# Patient Record
Sex: MALE | Race: WHITE | NOT HISPANIC OR LATINO | Employment: OTHER | URBAN - METROPOLITAN AREA
[De-identification: names, ages, dates, MRNs, and addresses within clinical notes are randomized per-mention and may not be internally consistent; named-entity substitution may affect disease eponyms.]

---

## 2021-01-14 ENCOUNTER — OFFICE VISIT (OUTPATIENT)
Dept: DERMATOLOGY | Facility: CLINIC | Age: 81
End: 2021-01-14
Payer: COMMERCIAL

## 2021-01-14 VITALS — BODY MASS INDEX: 27.92 KG/M2 | WEIGHT: 195 LBS | HEIGHT: 70 IN | TEMPERATURE: 98.7 F

## 2021-01-14 DIAGNOSIS — Z85.820 HISTORY OF MELANOMA: Primary | ICD-10-CM

## 2021-01-14 DIAGNOSIS — D22.9 MULTIPLE MELANOCYTIC NEVI: ICD-10-CM

## 2021-01-14 DIAGNOSIS — L81.4 LENTIGO: ICD-10-CM

## 2021-01-14 DIAGNOSIS — Z85.828 HISTORY OF BASAL CELL CARCINOMA: ICD-10-CM

## 2021-01-14 DIAGNOSIS — D48.9 NEOPLASM OF UNCERTAIN BEHAVIOR: ICD-10-CM

## 2021-01-14 PROCEDURE — 11103 TANGNTL BX SKIN EA SEP/ADDL: CPT | Performed by: DERMATOLOGY

## 2021-01-14 PROCEDURE — 88341 IMHCHEM/IMCYTCHM EA ADD ANTB: CPT | Performed by: STUDENT IN AN ORGANIZED HEALTH CARE EDUCATION/TRAINING PROGRAM

## 2021-01-14 PROCEDURE — 88305 TISSUE EXAM BY PATHOLOGIST: CPT | Performed by: STUDENT IN AN ORGANIZED HEALTH CARE EDUCATION/TRAINING PROGRAM

## 2021-01-14 PROCEDURE — 99204 OFFICE O/P NEW MOD 45 MIN: CPT | Performed by: DERMATOLOGY

## 2021-01-14 PROCEDURE — 11102 TANGNTL BX SKIN SINGLE LES: CPT | Performed by: DERMATOLOGY

## 2021-01-14 PROCEDURE — 88342 IMHCHEM/IMCYTCHM 1ST ANTB: CPT | Performed by: STUDENT IN AN ORGANIZED HEALTH CARE EDUCATION/TRAINING PROGRAM

## 2021-01-14 RX ORDER — LOSARTAN POTASSIUM 50 MG/1
50 TABLET ORAL DAILY
COMMUNITY
Start: 2020-07-01 | End: 2021-07-01

## 2021-01-14 RX ORDER — FEBUXOSTAT 40 MG/1
TABLET ORAL
COMMUNITY
Start: 2020-12-31

## 2021-01-14 RX ORDER — SIMVASTATIN 10 MG
10 TABLET ORAL EVERY EVENING
COMMUNITY
Start: 2020-11-16

## 2021-01-14 NOTE — PROGRESS NOTES
[facility] Note     Patient Name: Krystal Dumont Sr   Encounter Date: 01/14/2021     Have you been cared for by a [facility] Dermatologist in the last 3 years and, if so, which one? No    · Have you traveled outside of the [address] [place] [address] in the past 3 months or outside of the [facility] area in the last 2 weeks? No     May we call your Preferred Phone number to discuss your specific medical information? Yes     May we leave a detailed message that includes your specific medical information? Yes      Today's Chief Concerns:   Concern #1:  Full body check       Past Medical History:  Have you personally ever had or currently have any of the following? · Skin cancer (such as Melanoma, Basal Cell Carcinoma, Squamous Cell Carcinoma? (If Yes, please provide more detail)- YES, Melanoma, Basal Cell Carcinoma       Family History:  Have any of your "first degree relatives" (parent, brother, sister, or child) had any of the following       · Skin cancer such as Melanoma or Merkel Cell Carcinoma or Pancreatic Cancer? No      Current Medications:   (please update all dermatological medications before printing patient's AVS!)      Current Outpatient Medications:     apixaban (ELIQUIS) 5 mg, Take 5 mg by mouth 2 (two) times a day, Disp: , Rfl:     losartan (COZAAR) 50 mg tablet, Take 50 mg by mouth daily, Disp: , Rfl:     metoprolol tartrate (LOPRESSOR) 25 mg tablet, take 1 tablet by mouth twice a day, Disp: , Rfl:     Cholecalciferol 50 MCG (2000 UT) CAPS, Take 1 capsule by mouth, Disp: , Rfl:     simvastatin (ZOCOR) 10 mg tablet, Take 10 mg by mouth every evening, Disp: , Rfl:     Uloric 40 MG tablet, , Disp: , Rfl:         · Any known allergies?       · No Known Allergies      Physical Exam:     Was a chaperone (Derm Clinical Assistant) present throughout the entire Physical Exam? Yes     Did the Dermatology Team specifically  the patient on the importance of a Full Skin Exam to be sure that nothing is missed clinically?  Yes}  o Did the patient ultimately request or accept a Full Skin Exam?  Yes  o Did the patient specifically refuse to have the areas "under-the-bra" examined by the Dermatologist? No  o Did the patient specifically refuse to have the areas "under-the-underwear" examined by the Dermatologist? No    CONSTITUTIONAL:   Vitals:    01/14/21 1532   Temp: 98 7 °F (37 1 °C)   Weight: 88 5 kg (195 lb)   Height: 5' 10" (1 778 m)           PSYCH: Normal mood and affect  EYES: Normal conjunctiva  ENT: Normal lips and oral mucosa  CARDIOVASCULAR: No edema  RESPIRATORY: Normal respirations  HEME/LYMPH/IMMUNO:  No regional lymphadenopathy except as noted below in "ASSESSMENT AND PLAN BY DIAGNOSIS"    SKIN:  FULL ORGAN SYSTEM EXAM   Hair, Scalp, Ears, Face Normal except as noted below in Assessment   Neck, Cervical Chain Nodes Normal except as noted below in Assessment   Right Arm/Hand/Fingers Normal except as noted below in Assessment   Left Arm/Hand/Fingers Normal except as noted below in Assessment   Chest/Breasts/Axillae Viewed areas Normal except as noted below in Assessment   Abdomen, Umbilicus Normal except as noted below in Assessment   Back/Spine Normal except as noted below in Assessment   Groin/Genitalia/Buttocks Normal except as noted below in Assessment   Right Leg, Foot, Toes Normal except as noted below in Assessment   Left Leg, Foot, Toes Normal except as noted below in Assessment      HISTORY OF MELANOMA    Physical Exam:   Anatomic Location Affected:  Mid abdomen    Morphological Description of Scar:  Well healed scar    Year Treated: 2005   TNM Classification:    Suspected Recurrence: no   Regional adenopathy: no    Additional History of Present Condition:     Assessment and Plan:  Based on a thorough discussion of this condition and the management approach to it (including a comprehensive discussion of the known risks, side effects and potential benefits of treatment), the patient (family) agrees to implement the following specific plan:   When outside we recommend using a wide brim hat, sunglasses, long sleeve and pants, sunscreen with SPF 88+ with reapplication every 2 hours, or SPF specific clothing     What happens at follow-up? The main purpose of follow-up is to detect recurrences early (metastatic melanoma), but it also offers an opportunity to diagnose a new primary melanoma at the first possible opportunity  A second invasive melanoma occurs in 5-10% of melanoma patients and a new melanoma in situ is diagnosed in more than 20% of melanoma patients  Our practice makes the following recommendations for follow-up for patients with invasive melanoma   At-least "monthly" self-skin examinations    Routine skin checks by a board certified dermatologist   Follow-up intervals are "every 3 months" within 2 years of a new melanoma diagnosis; "every 6 months" between 2-4 years of a new melanoma diagnosis; and "annually" after 4 years of a new melanoma diagnosis   Individual patient's needs should be considered before an appropriate follow-up is offered   Provide education and support to help the patient adjust to their illness    Follow-up appointments should include:   A check of the scar where the primary melanoma was removed   Checking the regional lymph nodes   A general skin examination   A full physical examination at least annually by your primary care physician    In those with more advanced primary disease, follow-up may include:   Blood tests   Imaging: ultrasound, X-ray, CT, MRI and PET scan  Most tests are not worthwhile for patients with stage 1 or 2 melanoma unless there are signs or symptoms of disease recurrence or metastasis  No tests are necessary for healthy patients who have remained well for five years or longer after removal of their melanoma  What is the outlook for patients with melanoma?    Melanoma in situ is cured by excision because it has no potential to spread around the body   The risk of spread and ultimate death from invasive melanoma depends on several factors, but the main one is the Breslow thickness of the melanoma at the time it was surgically removed   Metastases are rare for melanomas < 0 75 mm and the risk for tumours 0 75-1 mm thick is about 5%  The risk steadily increases with thickness so that melanomas > 4 mm have a risk of metastasis of about 40%  Melanoma is a potentially serious type of skin cancer, in which there is uncontrolled growth of melanocytes (pigment cells)  Melanoma is sometimes called malignant melanoma  Normal melanocytes are found in the basal layer of the epidermis (the outer layer of skin)  Melanocytes produce a protein called melanin, which protects skin cells by absorbing ultraviolet (UV) radiation  Melanocytes are found in equal numbers in black and white skin, but melanocytes in black skin produce much more melanin  People with dark brown or black skin are very much less likely to be damaged by UV radiation than those with white skin  HISTORY OF BASAL CELL CARCINOMA    Physical Exam:   Anatomic Location Affected:  Right Breast    Morphological Description of scar:  Well healed scar    Suspected Recurrence: No   Pertinent Positives:   Pertinent Negatives: Additional History of Present Condition:  History of basal cell carcinoma with no sign of recurrence    Assessment and Plan:  Based on a thorough discussion of this condition and the management approach to it (including a comprehensive discussion of the known risks, side effects and potential benefits of treatment), the patient (family) agrees to implement the following specific plan:   When outside we recommend using a wide brim hat, sunglasses, long sleeve and pants, sunscreen with SPF 57+ with reapplication every 2 hours, or SPF specific clothing     How can basal cell carcinoma be prevented?   The most important way to prevent BCC is to avoid sunburn  This is especially important in childhood and early life  Fair skinned individuals and those with a personal or family history of BCC should protect their skin from sun exposure daily, year-round and lifelong   Stay indoors or under the shade in the middle of the day    Wear covering clothing    Apply high protection factor SPF50+ broad-spectrum sunscreens generously to exposed skin if outdoors    Avoid indoor tanning (sun beds, solaria)   Oral nicotinamide (vitamin B3) in a dose of 500 mg twice daily may reduce the number and severity of BCCs  What is the outlook for basal cell carcinoma? Most BCCs are cured by treatment  Cure is most likely if treatment is undertaken when the lesion is small  About 50% of people with BCC develop a second one within 3 years of the first  They are also at increased risk of other skin cancers, especially melanoma  Regular self-skin examinations and long-term annual skin checks by an experienced health professional are recommended  NEOPLASM OF UNCERTAIN BEHAVIOR OF SKIN A    Physical Exam:   (Anatomic Location); (Size and Morphological Description); (Differential Diagnosis):  o Left Earlobe 0 5 cm lucent papule; Diffdx; Basal Cell Carcinoma    Pertinent Positives:   Pertinent Negatives:    Assessment and Plan:   I have discussed with the patient that a sample of skin via a "skin biopsy would be potentially helpful to further make a specific diagnosis under the microscope   Based on a thorough discussion of this condition and the management approach to it (including a comprehensive discussion of the known risks, side effects and potential benefits of treatment), the patient (family) agrees to implement the following specific plan:    o Procedure:  Skin Biopsy    After a thorough discussion of treatment options and risk/benefits/alternatives (including but not limited to local pain, scarring, dyspigmentation, blistering, possible superinfection, and inability to confirm a diagnosis via histopathology), verbal and written consent were obtained and portion of the rash was biopsied for tissue sample  See below for consent that was obtained from patient and subsequent Procedure Note  PROCEDURE SHAVE BIOPSY NOTE:     Performing Physician: Dr Burns   Anatomic Location; Clinical Description with size (cm); Pre-Op Diagnosis:                                     Left Earlobe 0 5 cm lucent papule; Diffdx; Basal Cell Carcinoma    Post-op diagnosis: Same      Local anesthesia: 1% xylocaine with epi       Topical anesthesia: None     Hemostasis: Aluminum chloride       After obtaining informed consent  at which time there was a discussion about the purpose of biopsy  and low risks of infection and bleeding  The area was prepped and draped in the usual fashion  Anesthesia was obtained with 1% lidocaine with epinephrine  A shave biopsy to an appropriate sampling depth was obtained with a sterile blade (such as a 15-blade or DermaBlade)  The resulting wound was covered with surgical ointment and bandaged appropriately  The patient tolerated the procedure well without complications and was without signs of functional compromise  Specimen has been sent for review by Dermatopathology  Standard post-procedure care has been explained and has been included in written form within the patient's copy of Informed Consent  INFORMED CONSENT DISCUSSION AND POST-OPERATIVE INSTRUCTIONS FOR PATIENT    I   RATIONALE FOR PROCEDURE  I understand that a skin biopsy allows the Dermatologist to test a lesion or rash under the microscope to obtain a diagnosis  It usually involves numbing the area with numbing medication and removing a small piece of skin; sometimes the area will be closed with sutures  In this specific procedure, sutures are not usually needed    If any sutures are placed, then they are usually need to be removed in 2 weeks or less  I understand that my Dermatologist recommends that a skin "shave" biopsy be performed today  A local anesthetic, similar to the kind that a dentist uses when filling a cavity, will be injected with a very small needle into the skin area to be sampled  The injected skin and tissue underneath "will go to sleep and become numb so no pain should be felt afterwards  An instrument shaped like a tiny "razor blade" (shave biopsy instrument) will be used to cut a small piece of tissue and skin from the area so that a sample of tissue can be taken and examined more closely under the microscope  A slight amount of bleeding will occur, but it will be stopped with direct pressure and a pressure bandage and any other appropriate methods  I understands that a scar will form where the wound was created  Surgical ointment will be applied to help protect the wound  Sutures are not usually needed  II   RISKS AND POTENTIAL COMPLICATIONS   I understand the risks and potential complications of a skin biopsy include but are not limited to the following:   Bleeding   Infection   Pain   Scar/keloid   Skin discoloration   Incomplete Removal   Recurrence   Nerve Damage/Numbness/Loss of Function   Allergic Reaction to Anesthesia   Biopsies are diagnostic procedures and based on findings additional treatment or evaluation may be required   Loss or destruction of specimen resulting in no additional findings    My Dermatologist has explained to me the nature of the condition, the nature of the procedure, and the benefits to be reasonably expected compared with alternative approaches  My Dermatologist has discussed the likelihood of major risks or complications of this procedure including the specific risks listed above, such as bleeding, infection, and scarring/keloid  I understand that a scar is expected after this procedure    I understand that my physician cannot predict if the scar will form a "keloid," which extends beyond the borders of the wound that is created  A keloid is a thick, painful, and bumpy scar  A keloid can be difficult to treat, as it does not always respond well to therapy, which includes injecting cortisone directly into the keloid every few weeks  While this usually reduces the pain and size of the scar, it does not eliminate it  I understand that photographs may be taken before and after the procedure  These will be maintained as part of the medical providers confidential records and may not be made available to me  I further authorize the medical provider to use the photographs for teaching purposes or to illustrate scientific papers, books, or lectures if in his/her judgment, medical research, education, or science may benefit from its use  I have had an opportunity to fully inquire about the risks and benefits of this procedure and its alternatives  I have been given ample time and opportunity to ask questions and to seek a second opinion if I wished to do so  I acknowledge that there have specifically been no guarantees as to the cosmetic results from the procedure  I am aware that with any procedure there is always the possibility of an unexpected complication  III  POST-PROCEDURAL CARE (WHAT YOU WILL NEED TO DO "AFTER THE BIOPSY" TO OPTIMIZE HEALING)     Keep the area clean and dry  Try NOT to remove the bandage or get it wet for the first 24 hours   Gently clean the area and apply surgical ointment (such as Vaseline petrolatum ointment, which is available "over the counter" and not a prescription) to the biopsy site for up to 2 weeks straight  This acts to protect the wound from the outside world   Sutures are not usually placed in this procedure  If any sutures were placed, return for suture removal as instructed (generally 1 week for the face, 2 weeks for the body)   Take Acetaminophen (Tylenol) for discomfort, if no contraindications  Ibuprofen or aspirin could make bleeding worse   Call our office immediately for signs of infection: fever, chills, increased redness, warmth, tenderness, discomfort/pain, or pus or foul smell coming from the wound  WHAT TO DO IF THERE IS ANY BLEEDING? If a small amount of bleeding is noticed, place a clean cloth over the area and apply firm pressure for ten minutes  Check the wound after 10 minutes of direct pressure  If bleeding persists, try one more time for an additional 10 minutes of direct pressure on the area  If the bleeding becomes heavier or does not stop after the second attempt, or if you have any other questions about this procedure, then please call your [facility]ermatologist by calling [phone] (SKIN)  I hereby acknowledge that I have reviewed and verified the site with my Dermatologist and have requested and authorized my Dermatologist to proceed with the procedure  NEOPLASM OF UNCERTAIN BEHAVIOR OF SKIN B    Physical Exam:   (Anatomic Location); (Size and Morphological Description); (Differential Diagnosis):  Left lower back; 1 cm x 0 7cm ulcerated red plaque; Diffdx; Basal Cell Carcinoma    Pertinent Positives:   Pertinent Negatives:      Assessment and Plan:   I have discussed with the patient that a sample of skin via a "skin biopsy would be potentially helpful to further make a specific diagnosis under the microscope   Based on a thorough discussion of this condition and the management approach to it (including a comprehensive discussion of the known risks, side effects and potential benefits of treatment), the patient (family) agrees to implement the following specific plan:    o Procedure:  Skin Biopsy    After a thorough discussion of treatment options and risk/benefits/alternatives (including but not limited to local pain, scarring, dyspigmentation, blistering, possible superinfection, and inability to confirm a diagnosis via histopathology), verbal and written consent were obtained and portion of the rash was biopsied for tissue sample  See below for consent that was obtained from patient and subsequent Procedure Note  PROCEDURE SHAVE BIOPSY NOTE:     Performing Physician: Dr Burns   Anatomic Location; Clinical Description with size (cm); Pre-Op Diagnosis:                                 Left lower back; 1 cm x 0 7cm ulcerated red plaque; Diffdx; Basal Cell Carcinoma   Post-op diagnosis: Same      Local anesthesia: 1% xylocaine with epi       Topical anesthesia: None     Hemostasis: Aluminum chloride     NEOPLASM OF UNCERTAIN BEHAVIOR OF SKIN C     Physical Exam:   (Anatomic Location); (Size and Morphological Description); (Differential Diagnosis):  o Right shoulder; 0 7cm x 0 5 cm brown macule with irregular pigment DIFF atypical nevus VERSUS Melanoma situ    Pertinent Positives:   Pertinent Negatives:    Assessment and Plan:   I have discussed with the patient that a sample of skin via a "skin biopsy would be potentially helpful to further make a specific diagnosis under the microscope   Based on a thorough discussion of this condition and the management approach to it (including a comprehensive discussion of the known risks, side effects and potential benefits of treatment), the patient (family) agrees to implement the following specific plan:    o Procedure:  Skin Biopsy  After a thorough discussion of treatment options and risk/benefits/alternatives (including but not limited to local pain, scarring, dyspigmentation, blistering, possible superinfection, and inability to confirm a diagnosis via histopathology), verbal and written consent were obtained and portion of the rash was biopsied for tissue sample  See below for consent that was obtained from patient and subsequent Procedure Note    PROCEDURE SHAVE BIOPSY NOTE:     Performing Physician: Dr Burns   Anatomic Location; Clinical Description with size (cm); Pre-Op Diagnosis: Right shoulder; 0 7cm x0 5cm brown macule with irregular pigment DIFF atypical                             nevus VERSUS Melanoma situ    Post-op diagnosis: Same      Local anesthesia: 1% xylocaine with epi       Topical anesthesia: None     Hemostasis: Aluminum chloride       NEOPLASM OF UNCERTAIN BEHAVIOR OF SKIN D    Physical Exam:   (Anatomic Location); (Size and Morphological Description); (Differential Diagnosis):  o Right Arm; 1 cm x 1 cm  brown macule with irregular pigment; DIFF; Atypical  Nnevus VERSUS Melanoma situ    Pertinent Positives:   Pertinent Negatives:      Assessment and Plan:   I have discussed with the patient that a sample of skin via a "skin biopsy would be potentially helpful to further make a specific diagnosis under the microscope   Based on a thorough discussion of this condition and the management approach to it (including a comprehensive discussion of the known risks, side effects and potential benefits of treatment), the patient (family) agrees to implement the following specific plan:    o Procedure:  Skin Biopsy  After a thorough discussion of treatment options and risk/benefits/alternatives (including but not limited to local pain, scarring, dyspigmentation, blistering, possible superinfection, and inability to confirm a diagnosis via histopathology), verbal and written consent were obtained and portion of the rash was biopsied for tissue sample  See below for consent that was obtained from patient and subsequent Procedure Note  PROCEDURE SHAVE BIOPSY NOTE:     Performing Physician: Dr Burns   Anatomic Location; Clinical Description with size (cm); Pre-Op Diagnosis:                                 Right Arm; 1 cm x 1 cm  brown macule with irregular pigment; DIFF;  Atypical                                nevus VERSUS Melanoma situ    Post-op diagnosis: Same      Local anesthesia: 1% xylocaine with epi       Topical anesthesia: None     Hemostasis: Aluminum chloride       LENTIGO    Physical Exam:   Anatomic Location Affected:  Trunk and extremities    Morphological Description:  Light brown macules   Pertinent Positives:   Pertinent Negatives:     Assessment and Plan:  Based on a thorough discussion of this condition and the management approach to it (including a comprehensive discussion of the known risks, side effects and potential benefits of treatment), the patient (family) agrees to implement the following specific plan:   When outside we recommend using a wide brim hat, sunglasses, long sleeve and pants, sunscreen with SPF 53+ with reapplication every 2 hours, or SPF specific clothing     What is a lentigo? A lentigo is a pigmented flat or slightly raised lesion with a clearly defined edge  Unlike an ephelis (freckle), it does not fade in the winter months  There are several kinds of lentigo  The name lentigo originally referred to its appearance resembling a small lentil  The plural of lentigo is lentigines, although lentigos is also in common use  Who gets lentigines? Lentigines can affect males and females of all ages and races  Solar lentigines are especially prevalent in fair skinned adults  Lentigines associated with syndromes are present at birth or arise during childhood  What causes lentigines? Common forms of lentigo are due to exposure to ultraviolet radiation:   Sun damage including sunburn    Indoor tanning    Phototherapy, especially photochemotherapy (PUVA)    Ionizing radiation, eg radiation therapy, can also cause lentigines  Several familial syndromes associated with widespread lentigines originate from mutations in Eloy-MAP kinase, mTOR signaling and PTEN pathways  What are the clinical features of lentigines?   Lentigines have been classified into several different types depending on what they look like, where they appear on the body, causative factors, and whether they are associated to other diseases or conditions  Lentigines may be solitary or more often, multiple  Most lentigines are smaller than 5 mm in diameter      Lentigo simplex   A precursor to junctional naevus    Arises during childhood and early adult life    Found on trunk and limbs    Small brown round or oval macule or thin plaque    Jagged or smooth edge    May have a dry surface    May disappear in time  Solar lentigo   A precursor to seborrhoeic keratosis    Found on chronically sun exposed sites such as hands, face, lower legs    May also follow sunburn to shoulders    Yellow, light or dark brown regular or irregular macule or thin plaque    May have a dry surface    Often has moth-eaten outline    Can slowly enlarge to several centimeters in diameter    May disappear, often through the process known as lichenoid keratosis    When atypical in appearance, may be difficult to distinguish from melanoma in situ  Ink spot lentigo   Also known as reticulated lentigo    Few in number compared to solar lentigines    Follows sunburn in very fair skinned individuals    Dark brown to black irregular ink spot-like macule  PUVA lentigo   Similar to ink spot lentigo but follows photochemotherapy (PUVA)    Location anywhere exposed to PUVA  Tanning bed lentigo   Similar to ink spot lentigo but follows indoor tanning    Location anywhere exposed to tanning bed  Radiation lentigo   Occurs in site of irradiation (accidental or therapeutic)    Associated with late-stage radiation dermatitis: epidermal atrophy, subcutaneous fibrosis, keratosis, telangiectasias  Melanotic macule   Mucosal surfaces or adjacent glabrous skin eg lip, vulva, penis, anus    Light to dark brown    Also called mucosal melanosis  Generalised lentigines   Found on any exposed or covered site from early childhood    Small macules may merge to form larger patches    Not associated with a syndrome    Also called lentigines profusa, multiple lentigines  Agminated lentigines   Naevoid eruption of lentigos confined to a single segmental area    Sharp demarcation in midline    May have associated neurological and developmental abnormalities  Patterned lentigines   Inherited tendency to lentigines on face, lips, buttocks, palms, soles    Recognised mainly in people of  ethnicity  Centrofacial neurodysraphic lentiginosis  Associated with mental retardation  Lentiginosis syndromes   Syndromes include LEOPARD/Regan, Peutz-Jeghers, Laugier-Hunziker, Moynahan, Xeroderma pigmentosum, myxoma syndromes (RUBIO, NAME, Garcia), Ruvalcaba-Myhre-Hoyt, Bannayan-Zonnana syndrome, Cowden disease (multiple hamartoma syndrome )    Inheritance is autosomal dominant; sporadic cases common    Widespread lentigines present at birth or arise in early childhood    Associated with neural, endocrine, and mesenchymal tumors    How is the diagnosis made? Lentigines are usually diagnosed clinically by their typical appearance  Concern regarding possibility of melanoma may lead to:   Dermatoscopy    Diagnostic excision biopsy    Histopathology of a lentigo shows:   Thickened epidermis    An increased number of melanocytes along the basal layer of epidermis    Unlike junctional melanocytic naevus, there are no nests of melanocytes    Increased melanin pigment within the keratinocytes    Additional features depending on type of lentigo    In contrast, an ephelis (freckle) shows sun-induced increased melanin within the keratinocytes, without an increase in number of cells  What is the treatment for lentigines? Most lentigines are left alone  Attempts to lighten them may not be successful   The following approaches are used:   SPF 50+ broad-spectrum sunscreen    Hydroquinone bleaching cream    Alpha hydroxy acids    Vitamin C    Retinoids    Azelaic acid    Chemical peels  Individual lesions can be permanently removed using:   Cryotherapy    Intense pulsed light    Pigment lasers    How can lentigines be prevented? Lentigines associated with exposure ultraviolet radiation can be prevented by very careful sun protection  Clothing is more successful at preventing new lentigines than are sunscreens  What is the outlook for lentigines? Lentigines usually persist  They may increase in number with age and sun exposure  Some in sun-protected sites may fade and disappear     Stucco Keratosis   Physical Exam:   Anatomic Location Affected: Bilateral lower Legs    Morphological Description:  Crumbly verrucous papules and macules    Pertinent Positives:   Pertinent Negatives:    Assessment and Plan:  Based on a thorough discussion of this condition and the management approach to it (including a comprehensive discussion of the known risks, side effects and potential benefits of treatment), the patient (family) agrees to implement the following specific plan:   Reassure benign, monitor for any changes     MELANOCYTIC NEVI ("Moles")    Physical Exam:   Anatomic Location Affected:   Mostly on sun-exposed areas of the trunk and extremities    Morphological Description:  Scattered, 1-4mm round to ovoid, symmetrical-appearing, even bordered, skin colored to dark brown macules/papules, mostly in sun-exposed areas   Pertinent Positives:   Pertinent Negatives:      Assessment and Plan:  Based on a thorough discussion of this condition and the management approach to it (including a comprehensive discussion of the known risks, side effects and potential benefits of treatment), the patient (family) agrees to implement the following specific plan:   Reassure benign, monitor for any changes    When outside we recommend using a wide brim hat, sunglasses, long sleeve and pants, sunscreen with SPF 78+ with reapplication every 2 hours, or SPF specific clothing      Melanocytic Nevi  Melanocytic nevi ("moles") are tan or brown, raised or flat areas of the skin which have an increased number of melanocytes  Melanocytes are the cells in our body which make pigment and account for skin color  Some moles are present at birth (I e , "congenital nevi"), while others come up later in life (i e , "acquired nevi")  The sun can stimulate the body to make more moles  Sunburns are not the only thing that triggers more moles  Chronic sun exposure can do it too  Clinically distinguishing a healthy mole from melanoma may be difficult, even for experienced dermatologists  The "ABCDE's" of moles have been suggested as a means of helping to alert a person to a suspicious mole and the possible increased risk of melanoma  The suggestions for raising alert are as follows:    Asymmetry: Healthy moles tend to be symmetric, while melanomas are often asymmetric  Asymmetry means if you draw a line through the mole, the two halves do not match in color, size, shape, or surface texture  Asymmetry can be a result of rapid enlargement of a mole, the development of a raised area on a previously flat lesion, scaling, ulceration, bleeding or scabbing within the mole  Any mole that starts to demonstrate "asymmetry" should be examined promptly by a board certified dermatologist      Border: Healthy moles tend to have discrete, even borders  The border of a melanoma often blends into the normal skin and does not sharply delineate the mole from normal skin  Any mole that starts to demonstrate "uneven borders" should be examined promptly by a board certified dermatologist      Color: Healthy moles tend to be one color throughout  Melanomas tend to be made up of different colors ranging from dark black, blue, white, or red  Any mole that demonstrates a color change should be examined promptly by a board certified dermatologist      Diameter: Healthy moles tend to be smaller than 0 6 cm in size; an exception are "congenital nevi" that can be larger    Melanomas tend to grow and can often be greater than 0 6 cm (1/4 of an inch, or the size of a pencil eraser)  This is only a guideline, and many normal moles may be larger than 0 6 cm without being unhealthy  Any mole that starts to change in size (small to bigger or bigger to smaller) should be examined promptly by a board certified dermatologist      Evolving: Healthy moles tend to "stay the same "  Melanomas may often show signs of change or evolution such as a change in size, shape, color, or elevation  Any mole that starts to itch, bleed, crust, burn, hurt, or ulcerate or demonstrate a change or evolution should be examined promptly by a board certified dermatologist       Dysplastic Nevi  Dysplastic moles are moles that fit the ABCDE rules of melanoma but are not identified as melanomas when examined under the microscope  They may indicate an increased risk of melanoma in that person  If there is a family history of melanoma, most experts agree that the person may be at an increased risk for developing a melanoma  Experts still do not agree on what dysplastic moles mean in patients without a personal or family history of melanoma  Dysplastic moles are usually larger than common moles and have different colors within it with irregular borders  The appearance can be very similar to a melanoma  Biopsies of dysplastic moles may show abnormalities which are different from a regular mole  Melanoma  Malignant melanoma is a type of skin cancer that can be deadly if it spreads throughout the body  The incidence of melanoma in the [place] is growing faster than any other cancer  Melanoma usually grows near the surface of the skin for a period of time, and then begins to grow deeper into the skin  Once it grows deeper into the skin, the risk of spread to other organs greatly increases   Therefore, early detection and removal of a malignant melanoma may result in a better chance at a complete cure; removal after the tumor has spread may not be as effective, leading to worse clinical outcomes such as death  The true rate of nevus transformation into a melanoma is unknown  It has been estimated that the lifetime risk for any acquired melanocytic nevus on any 21year-old individual transforming into melanoma by age [de-identified] is 0 03% (1 in 3,164) for men and 0 009% (1 in 10,800) for women  The appearance of a "new mole" remains one of the most reliable methods for identifying a malignant melanoma  Occasionally, melanomas appear as rapidly growing, blue-black, dome-shaped bumps within a previous mole or previous area of normal skin  Other times, melanomas are suspected when a mole suddenly appears or changes  Itching, burning, or pain in a pigmented lesion should increase suspicion, but most patients with early melanoma have no skin discomfort whatsoever  Melanoma can occur anywhere on the skin, including areas that are difficult for self-examination  Many melanomas are first noticed by other family members  Suspicious-looking moles may be removed for microscopic examination  You may be able to prevent death from melanoma by doing two simple things:    1  Try to avoid unnecessary sun exposure and protect your skin when it is exposed to the sun  People who live near the equator, people who have intermittent exposures to large amounts of sun, and people who have had sunburns in childhood or adolescence have an increased risk for melanoma  Sun sense and vigilant sun protection may be keys to helping to prevent melanoma  We recommend wearing UPF-rated sun protective clothing and sunglasses whenever possible and applying a moisturizer-sunscreen combination product (SPF 50+) such as Neutrogena Daily Defense to sun exposed areas of skin at least three times a day  2  Have your moles regularly examined by a board certified dermatologist AND by yourself or a family member/friend at home    We recommend that you have your moles examined at least once a year by a board certified dermatologist  Use your birthday as an annual reminder to have your "Birthday Suit" (I e , your skin) examined; it is a nice birthday gift to yourself to know that your skin is healthy appearing! Additionally, at-home self examinations may be helpful for detecting a possible melanoma  Use the ABCDEs we discussed and check your moles once a month at home      Scribe Attestation    I,:  Javier Gregg MA am acting as a scribe while in the presence of the attending physician :       I,:  Komal Gooden MD personally performed the services described in this documentation    as scribed in my presence :

## 2021-01-14 NOTE — PATIENT INSTRUCTIONS
Assessment and Plan:  Based on a thorough discussion of this condition and the management approach to it (including a comprehensive discussion of the known risks, side effects and potential benefits of treatment), the patient (family) agrees to implement the following specific plan:   When outside we recommend using a wide brim hat, sunglasses, long sleeve and pants, sunscreen with SPF 72+ with reapplication every 2 hours, or SPF specific clothing     What happens at follow-up? The main purpose of follow-up is to detect recurrences early (metastatic melanoma), but it also offers an opportunity to diagnose a new primary melanoma at the first possible opportunity  A second invasive melanoma occurs in 5-10% of melanoma patients and a new melanoma in situ is diagnosed in more than 20% of melanoma patients  Our practice makes the following recommendations for follow-up for patients with invasive melanoma   At-least "monthly" self-skin examinations    Routine skin checks by a board certified dermatologist   Follow-up intervals are "every 3 months" within 2 years of a new melanoma diagnosis; "every 6 months" between 2-4 years of a new melanoma diagnosis; and "annually" after 4 years of a new melanoma diagnosis   Individual patient's needs should be considered before an appropriate follow-up is offered   Provide education and support to help the patient adjust to their illness    Follow-up appointments should include:   A check of the scar where the primary melanoma was removed   Checking the regional lymph nodes   A general skin examination   A full physical examination at least annually by your primary care physician    In those with more advanced primary disease, follow-up may include:   Blood tests   Imaging: ultrasound, X-ray, CT, MRI and PET scan  Most tests are not worthwhile for patients with stage 1 or 2 melanoma unless there are signs or symptoms of disease recurrence or metastasis   No tests are necessary for healthy patients who have remained well for five years or longer after removal of their melanoma  What is the outlook for patients with melanoma?  Melanoma in situ is cured by excision because it has no potential to spread around the body   The risk of spread and ultimate death from invasive melanoma depends on several factors, but the main one is the Breslow thickness of the melanoma at the time it was surgically removed   Metastases are rare for melanomas < 0 75 mm and the risk for tumours 0 75-1 mm thick is about 5%  The risk steadily increases with thickness so that melanomas > 4 mm have a risk of metastasis of about 40%  Melanoma is a potentially serious type of skin cancer, in which there is uncontrolled growth of melanocytes (pigment cells)  Melanoma is sometimes called malignant melanoma  Normal melanocytes are found in the basal layer of the epidermis (the outer layer of skin)  Melanocytes produce a protein called melanin, which protects skin cells by absorbing ultraviolet (UV) radiation  Melanocytes are found in equal numbers in black and white skin, but melanocytes in black skin produce much more melanin  People with dark brown or black skin are very much less likely to be damaged by UV radiation than those with white skin  Assessment and Plan:  Based on a thorough discussion of this condition and the management approach to it (including a comprehensive discussion of the known risks, side effects and potential benefits of treatment), the patient (family) agrees to implement the following specific plan:   When outside we recommend using a wide brim hat, sunglasses, long sleeve and pants, sunscreen with SPF 36+ with reapplication every 2 hours, or SPF specific clothing     How can basal cell carcinoma be prevented? The most important way to prevent BCC is to avoid sunburn  This is especially important in childhood and early life   Fair skinned individuals and those with a personal or family history of BCC should protect their skin from sun exposure daily, year-round and lifelong   Stay indoors or under the shade in the middle of the day    Wear covering clothing    Apply high protection factor SPF50+ broad-spectrum sunscreens generously to exposed skin if outdoors    Avoid indoor tanning (sun beds, solaria)   Oral nicotinamide (vitamin B3) in a dose of 500 mg twice daily may reduce the number and severity of BCCs  What is the outlook for basal cell carcinoma? Most BCCs are cured by treatment  Cure is most likely if treatment is undertaken when the lesion is small  About 50% of people with BCC develop a second one within 3 years of the first  They are also at increased risk of other skin cancers, especially melanoma  Regular self-skin examinations and long-term annual skin checks by an experienced health professional are recommended  Assessment and Plan:   I have discussed with the patient that a sample of skin via a "skin biopsy would be potentially helpful to further make a specific diagnosis under the microscope   Based on a thorough discussion of this condition and the management approach to it (including a comprehensive discussion of the known risks, side effects and potential benefits of treatment), the patient (family) agrees to implement the following specific plan:    o Procedure:  Skin Biopsy  After a thorough discussion of treatment options and risk/benefits/alternatives (including but not limited to local pain, scarring, dyspigmentation, blistering, possible superinfection, and inability to confirm a diagnosis via histopathology), verbal and written consent were obtained and portion of the rash was biopsied for tissue sample  See below for consent that was obtained from patient and subsequent Procedure Note    INFORMED CONSENT DISCUSSION AND POST-OPERATIVE INSTRUCTIONS FOR PATIENT    I   RATIONALE FOR PROCEDURE  I understand that a skin biopsy allows the Dermatologist to test a lesion or rash under the microscope to obtain a diagnosis  It usually involves numbing the area with numbing medication and removing a small piece of skin; sometimes the area will be closed with sutures  In this specific procedure, sutures are not usually needed  If any sutures are placed, then they are usually need to be removed in 2 weeks or less  I understand that my Dermatologist recommends that a skin "shave" biopsy be performed today  A local anesthetic, similar to the kind that a dentist uses when filling a cavity, will be injected with a very small needle into the skin area to be sampled  The injected skin and tissue underneath "will go to sleep and become numb so no pain should be felt afterwards  An instrument shaped like a tiny "razor blade" (shave biopsy instrument) will be used to cut a small piece of tissue and skin from the area so that a sample of tissue can be taken and examined more closely under the microscope  A slight amount of bleeding will occur, but it will be stopped with direct pressure and a pressure bandage and any other appropriate methods  I understands that a scar will form where the wound was created  Surgical ointment will be applied to help protect the wound  Sutures are not usually needed      II   RISKS AND POTENTIAL COMPLICATIONS   I understand the risks and potential complications of a skin biopsy include but are not limited to the following:   Bleeding   Infection   Pain   Scar/keloid   Skin discoloration   Incomplete Removal   Recurrence   Nerve Damage/Numbness/Loss of Function   Allergic Reaction to Anesthesia   Biopsies are diagnostic procedures and based on findings additional treatment or evaluation may be required   Loss or destruction of specimen resulting in no additional findings    My Dermatologist has explained to me the nature of the condition, the nature of the procedure, and the benefits to be reasonably expected compared with alternative approaches  My Dermatologist has discussed the likelihood of major risks or complications of this procedure including the specific risks listed above, such as bleeding, infection, and scarring/keloid  I understand that a scar is expected after this procedure  I understand that my physician cannot predict if the scar will form a "keloid," which extends beyond the borders of the wound that is created  A keloid is a thick, painful, and bumpy scar  A keloid can be difficult to treat, as it does not always respond well to therapy, which includes injecting cortisone directly into the keloid every few weeks  While this usually reduces the pain and size of the scar, it does not eliminate it  I understand that photographs may be taken before and after the procedure  These will be maintained as part of the medical providers confidential records and may not be made available to me  I further authorize the medical provider to use the photographs for teaching purposes or to illustrate scientific papers, books, or lectures if in his/her judgment, medical research, education, or science may benefit from its use  I have had an opportunity to fully inquire about the risks and benefits of this procedure and its alternatives  I have been given ample time and opportunity to ask questions and to seek a second opinion if I wished to do so  I acknowledge that there have specifically been no guarantees as to the cosmetic results from the procedure  I am aware that with any procedure there is always the possibility of an unexpected complication  III  POST-PROCEDURAL CARE (WHAT YOU WILL NEED TO DO "AFTER THE BIOPSY" TO OPTIMIZE HEALING)     Keep the area clean and dry  Try NOT to remove the bandage or get it wet for the first 24 hours       Gently clean the area and apply surgical ointment (such as Vaseline petrolatum ointment, which is available "over the counter" and not a prescription) to the biopsy site for up to 2 weeks straight  This acts to protect the wound from the outside world   Sutures are not usually placed in this procedure  If any sutures were placed, return for suture removal as instructed (generally 1 week for the face, 2 weeks for the body)   Take Acetaminophen (Tylenol) for discomfort, if no contraindications  Ibuprofen or aspirin could make bleeding worse   Call our office immediately for signs of infection: fever, chills, increased redness, warmth, tenderness, discomfort/pain, or pus or foul smell coming from the wound  WHAT TO DO IF THERE IS ANY BLEEDING? If a small amount of bleeding is noticed, place a clean cloth over the area and apply firm pressure for ten minutes  Check the wound after 10 minutes of direct pressure  If bleeding persists, try one more time for an additional 10 minutes of direct pressure on the area  If the bleeding becomes heavier or does not stop after the second attempt, or if you have any other questions about this procedure, then please call your [facility] [place] [facility]"" - [facility]'s Dermatologist by calling [phone] (SKIN)  I hereby acknowledge that I have reviewed and verified the site with my Dermatologist and have requested and authorized my Dermatologist to proceed with the procedure  Assessment and Plan:  Based on a thorough discussion of this condition and the management approach to it (including a comprehensive discussion of the known risks, side effects and potential benefits of treatment), the patient (family) agrees to implement the following specific plan:   When outside we recommend using a wide brim hat, sunglasses, long sleeve and pants, sunscreen with SPF 31+ with reapplication every 2 hours, or SPF specific clothing     What is a lentigo? A lentigo is a pigmented flat or slightly raised lesion with a clearly defined edge  Unlike an ephelis (freckle), it does not fade in the winter months  There are several kinds of lentigo    The name lentigo originally referred to its appearance resembling a small lentil  The plural of lentigo is lentigines, although lentigos is also in common use  Who gets lentigines? Lentigines can affect males and females of all ages and races  Solar lentigines are especially prevalent in fair skinned adults  Lentigines associated with syndromes are present at birth or arise during childhood  What causes lentigines? Common forms of lentigo are due to exposure to ultraviolet radiation:   Sun damage including sunburn    Indoor tanning    Phototherapy, especially photochemotherapy (PUVA)    Ionizing radiation, eg radiation therapy, can also cause lentigines  Several familial syndromes associated with widespread lentigines originate from mutations in Eloy-MAP kinase, mTOR signaling and PTEN pathways  What are the clinical features of lentigines? Lentigines have been classified into several different types depending on what they look like, where they appear on the body, causative factors, and whether they are associated to other diseases or conditions  Lentigines may be solitary or more often, multiple  Most lentigines are smaller than 5 mm in diameter      Lentigo simplex   A precursor to junctional naevus    Arises during childhood and early adult life    Found on trunk and limbs    Small brown round or oval macule or thin plaque    Jagged or smooth edge    May have a dry surface    May disappear in time  Solar lentigo   A precursor to seborrhoeic keratosis    Found on chronically sun exposed sites such as hands, face, lower legs    May also follow sunburn to shoulders    Yellow, light or dark brown regular or irregular macule or thin plaque    May have a dry surface    Often has moth-eaten outline    Can slowly enlarge to several centimeters in diameter    May disappear, often through the process known as lichenoid keratosis    When atypical in appearance, may be difficult to distinguish from melanoma in situ  Ink spot lentigo   Also known as reticulated lentigo    Few in number compared to solar lentigines    Follows sunburn in very fair skinned individuals    Dark brown to black irregular ink spot-like macule  PUVA lentigo   Similar to ink spot lentigo but follows photochemotherapy (PUVA)    Location anywhere exposed to PUVA  Tanning bed lentigo   Similar to ink spot lentigo but follows indoor tanning    Location anywhere exposed to tanning bed  Radiation lentigo   Occurs in site of irradiation (accidental or therapeutic)    Associated with late-stage radiation dermatitis: epidermal atrophy, subcutaneous fibrosis, keratosis, telangiectasias  Melanotic macule   Mucosal surfaces or adjacent glabrous skin eg lip, vulva, penis, anus    Light to dark brown    Also called mucosal melanosis  Generalised lentigines   Found on any exposed or covered site from early childhood    Small macules may merge to form larger patches    Not associated with a syndrome    Also called lentigines profusa, multiple lentigines  Agminated lentigines   Naevoid eruption of lentigos confined to a single segmental area    Sharp demarcation in midline    May have associated neurological and developmental abnormalities  Patterned lentigines   Inherited tendency to lentigines on face, lips, buttocks, palms, soles    Recognised mainly in people of  ethnicity  Centrofacial neurodysraphic lentiginosis  Associated with mental retardation  Lentiginosis syndromes   Syndromes include LEOPARD/Regan, Peutz-Jeghers, Laugier-Hunziker, Moynahan, Xeroderma pigmentosum, myxoma syndromes (RUBIO, NAME, Garcia), Ruvalcaba-Myhre-Hoyt, Bannayan-Zonnana syndrome, Cowden disease (multiple hamartoma syndrome )    Inheritance is autosomal dominant; sporadic cases common    Widespread lentigines present at birth or arise in early childhood    Associated with neural, endocrine, and mesenchymal tumors    How is the diagnosis made? Lentigines are usually diagnosed clinically by their typical appearance  Concern regarding possibility of melanoma may lead to:   Dermatoscopy    Diagnostic excision biopsy    Histopathology of a lentigo shows:   Thickened epidermis    An increased number of melanocytes along the basal layer of epidermis    Unlike junctional melanocytic naevus, there are no nests of melanocytes    Increased melanin pigment within the keratinocytes    Additional features depending on type of lentigo    In contrast, an ephelis (freckle) shows sun-induced increased melanin within the keratinocytes, without an increase in number of cells  What is the treatment for lentigines? Most lentigines are left alone  Attempts to lighten them may not be successful  The following approaches are used:   SPF 50+ broad-spectrum sunscreen    Hydroquinone bleaching cream    Alpha hydroxy acids    Vitamin C    Retinoids    Azelaic acid    Chemical peels  Individual lesions can be permanently removed using:   Cryotherapy    Intense pulsed light    Pigment lasers    How can lentigines be prevented? Lentigines associated with exposure ultraviolet radiation can be prevented by very careful sun protection  Clothing is more successful at preventing new lentigines than are sunscreens  What is the outlook for lentigines? Lentigines usually persist  They may increase in number with age and sun exposure  Some in sun-protected sites may fade and disappear    Stucco Keratosis   Physical Exam:   Anatomic Location Affected: Bilateral lower Legs    Morphological Description:  Crumbly verrucous papules and macules    Pertinent Positives:   Pertinent Negatives:    Assessment and Plan:  Based on a thorough discussion of this condition and the management approach to it (including a comprehensive discussion of the known risks, side effects and potential benefits of treatment), the patient (family) agrees to implement the following specific plan:   Reassure benign, monitor for any changes   Assessment and Plan:  Based on a thorough discussion of this condition and the management approach to it (including a comprehensive discussion of the known risks, side effects and potential benefits of treatment), the patient (family) agrees to implement the following specific plan:   Reassure benign, monitor for any changes    When outside we recommend using a wide brim hat, sunglasses, long sleeve and pants, sunscreen with SPF 66+ with reapplication every 2 hours, or SPF specific clothing      Melanocytic Nevi  Melanocytic nevi ("moles") are tan or brown, raised or flat areas of the skin which have an increased number of melanocytes  Melanocytes are the cells in our body which make pigment and account for skin color  Some moles are present at birth (I e , "congenital nevi"), while others come up later in life (i e , "acquired nevi")  The sun can stimulate the body to make more moles  Sunburns are not the only thing that triggers more moles  Chronic sun exposure can do it too  Clinically distinguishing a healthy mole from melanoma may be difficult, even for experienced dermatologists  The "ABCDE's" of moles have been suggested as a means of helping to alert a person to a suspicious mole and the possible increased risk of melanoma  The suggestions for raising alert are as follows:    Asymmetry: Healthy moles tend to be symmetric, while melanomas are often asymmetric  Asymmetry means if you draw a line through the mole, the two halves do not match in color, size, shape, or surface texture  Asymmetry can be a result of rapid enlargement of a mole, the development of a raised area on a previously flat lesion, scaling, ulceration, bleeding or scabbing within the mole  Any mole that starts to demonstrate "asymmetry" should be examined promptly by a board certified dermatologist      Border: Healthy moles tend to have discrete, even borders    The border of a melanoma often blends into the normal skin and does not sharply delineate the mole from normal skin  Any mole that starts to demonstrate "uneven borders" should be examined promptly by a board certified dermatologist      Color: Healthy moles tend to be one color throughout  Melanomas tend to be made up of different colors ranging from dark black, blue, white, or red  Any mole that demonstrates a color change should be examined promptly by a board certified dermatologist      Diameter: Healthy moles tend to be smaller than 0 6 cm in size; an exception are "congenital nevi" that can be larger  Melanomas tend to grow and can often be greater than 0 6 cm (1/4 of an inch, or the size of a pencil eraser)  This is only a guideline, and many normal moles may be larger than 0 6 cm without being unhealthy  Any mole that starts to change in size (small to bigger or bigger to smaller) should be examined promptly by a board certified dermatologist      Evolving: Healthy moles tend to "stay the same "  Melanomas may often show signs of change or evolution such as a change in size, shape, color, or elevation  Any mole that starts to itch, bleed, crust, burn, hurt, or ulcerate or demonstrate a change or evolution should be examined promptly by a board certified dermatologist       Dysplastic Nevi  Dysplastic moles are moles that fit the ABCDE rules of melanoma but are not identified as melanomas when examined under the microscope  They may indicate an increased risk of melanoma in that person  If there is a family history of melanoma, most experts agree that the person may be at an increased risk for developing a melanoma  Experts still do not agree on what dysplastic moles mean in patients without a personal or family history of melanoma  Dysplastic moles are usually larger than common moles and have different colors within it with irregular borders  The appearance can be very similar to a melanoma   Biopsies of dysplastic moles may show abnormalities which are different from a regular mole  Melanoma  Malignant melanoma is a type of skin cancer that can be deadly if it spreads throughout the body  The incidence of melanoma in the [place] is growing faster than any other cancer  Melanoma usually grows near the surface of the skin for a period of time, and then begins to grow deeper into the skin  Once it grows deeper into the skin, the risk of spread to other organs greatly increases  Therefore, early detection and removal of a malignant melanoma may result in a better chance at a complete cure; removal after the tumor has spread may not be as effective, leading to worse clinical outcomes such as death  The true rate of nevus transformation into a melanoma is unknown  It has been estimated that the lifetime risk for any acquired melanocytic nevus on any 21year-old individual transforming into melanoma by age [de-identified] is 0 03% (1 in 3,164) for men and 0 009% (1 in 10,800) for women  The appearance of a "new mole" remains one of the most reliable methods for identifying a malignant melanoma  Occasionally, melanomas appear as rapidly growing, blue-black, dome-shaped bumps within a previous mole or previous area of normal skin  Other times, melanomas are suspected when a mole suddenly appears or changes  Itching, burning, or pain in a pigmented lesion should increase suspicion, but most patients with early melanoma have no skin discomfort whatsoever  Melanoma can occur anywhere on the skin, including areas that are difficult for self-examination  Many melanomas are first noticed by other family members  Suspicious-looking moles may be removed for microscopic examination  You may be able to prevent death from melanoma by doing two simple things:    1  Try to avoid unnecessary sun exposure and protect your skin when it is exposed to the sun    People who live near the equator, people who have intermittent exposures to large amounts of sun, and people who have had sunburns in childhood or adolescence have an increased risk for melanoma  Sun sense and vigilant sun protection may be keys to helping to prevent melanoma  We recommend wearing UPF-rated sun protective clothing and sunglasses whenever possible and applying a moisturizer-sunscreen combination product (SPF 50+) such as Neutrogena Daily Defense to sun exposed areas of skin at least three times a day  2  Have your moles regularly examined by a board certified dermatologist AND by yourself or a family member/friend at home  We recommend that you have your moles examined at least once a year by a board certified dermatologist   Use your birthday as an annual reminder to have your "Birthday Suit" (I e , your skin) examined; it is a nice birthday gift to yourself to know that your skin is healthy appearing! Additionally, at-home self examinations may be helpful for detecting a possible melanoma  Use the ABCDEs we discussed and check your moles once a month at home

## 2021-01-19 NOTE — RESULT ENCOUNTER NOTE
DERMATOPATHOLOGY RESULT NOTE    Results reviewed by ordering physician  Called patient to personally discuss results  Discussed results with patient  Instructions for Clinical Derm Team:   (remember to route Result Note to appropriate staff):    Call patient and schedule for MOHS    Result & Plan by Specimen:    Specimen A: malignant  Plan: MOHs      Specimen B: benign  Plan: reassured, benign      Specimen C: benign  Plan: reassured, benign      Specimen D: benign  Plan: reassured, benign    Final Diagnosis  A  Skin, left earlobe, shave biopsy:     BASAL CELL CARCINOMA, SUPERFICIAL AND NODULAR TYPES; extending to the tissue edges          B  Skin, left lower back, shave biopsy:     Consistent with SEBORRHEIC KERATOSIS, ulcerated and inflamed, with focal squamous dypslasia (see note)      Note: If the lesion were to progress, additional sampling should be sought           C  Skin, right shoulder, shave biopsy:     Consistent with LENTIGO/evolving SEBORRHEIC KERATOSIS (see note)  Note: SOX10 and MART-1 immunostains were performed and support the diagnosis        D  Skin, right arm, shave biopsy:     Consistent with LENTIGO/evolving SEBORRHEIC KERATOSIS (see note)  Note: SOX10 and MART-1 immunostains were performed and support the diagnosis

## 2021-01-21 ENCOUNTER — TELEPHONE (OUTPATIENT)
Dept: DERMATOLOGY | Facility: CLINIC | Age: 81
End: 2021-01-21

## 2021-01-21 NOTE — TELEPHONE ENCOUNTER
Pre- operative Mohs Telephone Scheduling Note    Do you take antibiotics before skin or dental procedures? yes: Amoxicillin only for Dental  If yes, will likely require pre-operative antibiotics  Ask  the patient why they take the antibiotics (usually because of joint replacement)  Do you have a history of a joint replacements within the past 2 years? no   If yes, will likely require pre-operative antibiotics  Ask if orthopaedic surgeon has prescribed pre-operative antibiotics to take before procedures/dental work? Do you take any OTC medications that thin your blood (Aspirin, Aleve, Ibuprofen) or supplements that thin your blood (fish oil, garlic, vitamin E, Ginko Biloba)? no    Do you take any prescribed medications that thin your blood (Coumadin, Plavix, Xarelto, Eliquis or another prescribed blood thinner)? yes: Eliquis    Do you have an allergy to lidocaine? no    Do you have an allergy to shellfish? no    Do you smoke? no, Cigars       If yes,  patient to try and stop 2 days before surgery and 7 days after the surgery  Minimizing smoking as much as possible during this time will improve healing and the cosmetic result after surgery  Date scheduled: 03/10/2021 8:00am Dr Cookie Monterroso of Care with other provider (Marcelle Kwan, ENT) required? no   IF YES, PLEASE FORWARD TO APPROPRIATE PERSONNEL TO HELP COORDINATE  Are there remaining tumors to be scheduled?  no

## 2021-03-10 ENCOUNTER — PROCEDURE VISIT (OUTPATIENT)
Dept: DERMATOLOGY | Facility: CLINIC | Age: 81
End: 2021-03-10
Payer: COMMERCIAL

## 2021-03-10 VITALS
RESPIRATION RATE: 14 BRPM | TEMPERATURE: 97.3 F | WEIGHT: 198.8 LBS | HEART RATE: 92 BPM | DIASTOLIC BLOOD PRESSURE: 88 MMHG | BODY MASS INDEX: 28.52 KG/M2 | SYSTOLIC BLOOD PRESSURE: 134 MMHG

## 2021-03-10 DIAGNOSIS — C44.219 BASAL CELL CARCINOMA (BCC) OF SKIN OF LEFT EAR: Primary | ICD-10-CM

## 2021-03-10 PROCEDURE — 12051 INTMD RPR FACE/MM 2.5 CM/<: CPT | Performed by: DERMATOLOGY

## 2021-03-10 PROCEDURE — 17311 MOHS 1 STAGE H/N/HF/G: CPT | Performed by: DERMATOLOGY

## 2021-03-10 NOTE — PATIENT INSTRUCTIONS
Mohs Microscopic Surgery After Care    WOUND CARE AFTER SURGERY:    1  Try NOT to remove the pressure bandage for 48 hours  Keep the area clean and dry while this bandage is on  2  After removing the bandage for the first time, gently clean the area with soap and water  If the bandage is difficult to remove, getting the bandage wet in the shower will sometimes help soften the adhesive and allow it to be removed more easily  3  You will now need to cleanse this area daily in the shower with gentle soap  There is no need to scrub the area  You will need to apply plain Vaseline ointment (this is over the counter and not a prescription) to the site for up to 2 weeks followed by a clean appropriately sized bandage to area  Non stick dressing and paper tape (or Hypafix) are recommended for sensitive skin but a bandaid is fine if it covers the area well  4  Return for suture removal as instructed 7 days (generally 1 week for the face, 2 weeks for the body)  RESTRICTIONS:     For two DAYS:   - You will need to take it very easy as this time is highest risk for bleeding  Being a "couch potato" during these two days is generally recommended  - For surgeries on the face/neck/scalp: Avoid leaning down to pick things up off the floor as this brings blood up to your head  Instead, squat down to pick things up  For two WEEKS:   - No heavy lifting (anything greater than 10 pounds)   - You can start to do slow, gentle activities such as slow walking but nothing to increase your heart rate and blood pressure too much (such as cardiovascular exercise)  It is important to take it easy as there is still a risk for bleeding and a high risk popping of stitches open during this time  If we did surgery around your nose: No blowing your nose as this puts you at higher risk of popping stitches durign this time  Instead dab under your nose with a tissue or use a Q-tip inside your nose      If we did surgery on the skin above or below your lip or your lip itself: No sipping from straws as this uses a lot of the muscles around your mouth and increases the risk of popping stitches during this time  MANAGING YOUR PAIN AFTER SURGERY     You can expect to have some pain after surgery  This is normal  The pain is typically worse the first two days after surgery, and quickly begins to get better  The best strategy for controlling your pain after surgery is around the clock pain control  You can take over the counter Acetaminophen (Tylenol) for discomfort, if no contraindications  If you are taking this at the maximum dose, you can alternate this with Motrin (ibuprofen or Advil) as well  Alternating these medications with each other allows you to maximize your pain control  In addition to Tylenol and Motrin, you can use heating pads or ice packs on your incisions to help reduce your pain  How will I alternate your regular strength over-the-counter pain medication? You will take a dose of pain medication every three hours   Start by taking 650 mg of Tylenol (2 pills of 325 mg)   3 hours later take 600 mg of Motrin (3 pills of 200 mg)   3 hours after taking the Motrin take 650 mg of Tylenol   3 hours after that take 600 mg of Motrin  See example - if your first dose of Tylenol is at 12:00 PM     12:00 PM  Tylenol 650 mg (2 pills of 325 mg)    3:00 PM  Motrin 600 mg (3 pills of 200 mg)    6:00 PM  Tylenol 650 mg (2 pills of 325 mg)    9:00 PM  Motrin 600 mg (3 pills of 200 mg)    Continue alternating every 3 hours      Important:   Do not take more than 4000mg of Tylenol or 3200mg of Motrin in a 24-hour period  What if I still have pain? If you have pain that is not controlled with the over-the-counter pain medications (Tylenol and Motrin or Advil), don't hesitate to call our staff using the number provided   We will help make sure you are managing your pain in the best way possible, and if necessary, we can provide a prescription for additional pain medication  CALL OUR OFFICE IMMEDIATELY FOR ANY SIGNS OF INFECTION:    This includes fever, chills, increased redness, warmth, tenderness, severe discomfort/pain, or pus or foul smell coming from the wound  [facility] directly at [phone] (SKIN)    IF BLEEDING IS NOTICED:    Place a clean cloth over the area and apply firm pressure for thirty minutes  Check the wound ONLY after 30 minutes of direct pressure; do not cheat and sneak a peak, as that does not count  If bleeding persists after 30 minutes of legitimate direct pressure, then try one more round of direct pressure to the area  Should the bleeding become heavier or not stop after the second attempt, call [facility] directly at [phone] (SKIN) or, if after hours, go to your nearest Emergency Room or Urgent Care

## 2021-03-10 NOTE — LETTER
March 10, 2021     Dragan Luu [address] [place] [address]    Patient: Corrinne Crete YOB: 1940   Date of Visit: 3/10/2021       Dear Dr Patten Ion:    Thank you for referring Colette Cee to me for evaluation  Below are my notes for this consultation  If you have questions, please do not hesitate to call me  I look forward to following your patient along with you           Sincerely,        Althea Vogel MD        CC: No Recipients

## 2021-03-10 NOTE — PROGRESS NOTES
MOHS Procedure Note    Patient: Yumiko Yoon Sr   : 1940  MRN: [id number]  Date: 03/10/2021    History of Present Illness: The patient is a [de-identified] y o  male who presents with complaints of Basal Cell Carcinoma of the Left Earlobe  Past Medical History:   Diagnosis Date    Basal cell carcinoma     Right Breast     BCC (basal cell carcinoma) 03/10/2021    BCC- Left ear lobe Dr Angle [facility])     Mid Abdomen        Past Surgical History:   Procedure Laterality Date    MOHS SURGERY Left 03/10/2021    BCC, Left earlobe, Dr Alexys Hutton      right breast     SKIN CANCER EXCISION      mid abdomen          Current Outpatient Medications:     apixaban (ELIQUIS) 5 mg, Take 5 mg by mouth 2 (two) times a day, Disp: , Rfl:     Cholecalciferol 50 MCG ( UT) CAPS, Take 1 capsule by mouth, Disp: , Rfl:     losartan (COZAAR) 50 mg tablet, Take 50 mg by mouth daily, Disp: , Rfl:     metoprolol tartrate (LOPRESSOR) 25 mg tablet, take 1 tablet by mouth twice a day, Disp: , Rfl:     simvastatin (ZOCOR) 10 mg tablet, Take 10 mg by mouth every evening, Disp: , Rfl:     Uloric 40 MG tablet, , Disp: , Rfl:     No Known Allergies    Physical Exam:   Vitals:    03/10/21 0805   BP: 134/88   Pulse: 92   Resp: 14   Temp: (!) 97 3 °F (36 3 °C)     General: Awake, Alert, Oriented x 3, Mood and affect appropriate  Respiratory: Respirations even and unlabored  Cardiovascular: Peripheral pulses intact; no edema  Musculoskeletal Exam: n/a    Assessment: 0 7 x 0 7 pink scar; Biopsy proven to be Basal Cell Carcinoma of the Left earlobe      Plan: MOHS    MOHS Procedure Timeout      Most Recent Value   Timeout:  820   Patient Identity Verified:  Yes   Correct Site Verified:  Yes   Correct Procedure Verified:  Yes          MOHS Diagnosis/Indication/Location/ID      Most Recent Value   Pathology Type  Basal cell carcinoma   Anatomic Site  -- [Left earlobe ]   Indications for MOHS  tumor location   MOHS ID  [id number]          MOHS Site/Accession/Pre-Post      Most Recent Value   Original Site Identified (as submitted by referring clinician)  Photo   Biopsy Accession/Specimen # (as submitted by referring clincian)  [id number]   Pre-MOHS Size Length (cm)  0 7   Pre-MOHS Size Width (cm)  0 7   Post-MOHS Size-Length (cm)  1   Post MOHS Size-Width (cm)  1   Repair Type  Intermediate layered closure   Suture Type  Ethilon, Vicryl   Ethilon Suture Size  6   Vicryl Suture Size  6   Final repair length (cm):  2   Anesthetic Used  1% Lidocaine with epinephrine          MOHS Tumor Stage 1 Information      Most Recent Value   Tissue Sections (blocks)  1   Microscopic Exam Section 1:  No tumor identified in section  [post aggressive curettage]   Tumor Clear After Stage I? Yes                      Patient identified, procedure verified, site identified and verified  Time out completed  Surgical removal of the lesion discussed with the patient (risks and benefits, including possibility of scarring, infection, recurrence or potential for further treatment)  I have specifically identified the site with the patient  I have discussed the fact that the patient will have a scar after the procedure regardless of granulation or repair with sutures  I have discussed that the repair options can range from granulation in some cases to linear or curvilinear closures to larger flaps or grafts  There are sometimes flaps or grafts used that require multiples stages of surgery and will not be completed today, rather be completed over a series of appointments  I have discussed that occasionally due to location, size or depth of the lesion I may recommend consultation with and transfer of care for further removal or the reconstruction to another provider such as ophthalmology surgery, plastic surgery, ENT surgery, or surgical oncology   There are cases in which other testing such as imaging with MRI or CT scan or testing of lymph nodes is recommended because of the nature/depth/location of tumor seen during the removal  There is a risk of injury to nerves causing temporary or permanent numbness or the inability to move muscles full such as the inability to lift eyebrows  Questions answered and verbal and written consent was obtained  With the patient in the supine position and under adequate local anesthesia with 1% lidocaine with epinephrine 1:200,000, the defect was scrubbed with Hibiclens  Sterile drapes were placed from the sterile tray  Because of the location of the surgical defect, an intermediate closure was judged to give the best possible cosmetic and functional result  The edges of the defect were carefully debrided removing any dead or coagulated tissue  Hemostasis was obtained by pinpoint electrocoagulation  Careful planning of removal of redundant tissue at either end of the defect was drawn out so that the suture lines would fall in the optimal orientation with regard to the relaxed skin tension lines  These were then removed with a #15 blade scalpel  Estimated blood loss was less than 5 mL  Closure was in a layered fashion with deeply place subcuticular sutures and interrupted cuticular sutures  The patient tolerated the procedure well  The wound was dressed with petrolatum, a non-stick pad, and a compression dressing  Roland Dixon MD served as the surgeon and pathologist during the procedure  Postoperative care: Wound care discussed at length  I urged the patient to call us if any problems or question should arise  Complications: none  Post-op medications: none  Patient condition after procedure: stable  Discharge plans: Plan for suture removal 7days at next scheduled appointment  The tumor qualifies for Mohs based on AUC criteria  Dr Brandon Marcus  served as the surgeon and pathologist during the procedure  Postoperative care:  No would care should be necessary prior to the next visit but I urged the patient to call us if any problems or question should arise prior to that time  If circumstances should change, we will contact the patient to make other arrangements       Scribe Attestation    I,:  Rebecca Ramirez MA am acting as a scribe while in the presence of the attending physician :       I,:  Alexis Louis MD personally performed the services described in this documentation    as scribed in my presence :

## 2021-03-15 ENCOUNTER — TELEPHONE (OUTPATIENT)
Dept: DERMATOLOGY | Facility: CLINIC | Age: 81
End: 2021-03-15

## 2021-03-17 ENCOUNTER — OFFICE VISIT (OUTPATIENT)
Dept: DERMATOLOGY | Facility: CLINIC | Age: 81
End: 2021-03-17

## 2021-03-17 DIAGNOSIS — C44.219 BASAL CELL CARCINOMA (BCC) OF SKIN OF LEFT EAR: Primary | ICD-10-CM

## 2021-03-17 PROCEDURE — 99024 POSTOP FOLLOW-UP VISIT: CPT | Performed by: DERMATOLOGY

## 2021-03-17 NOTE — PROGRESS NOTES
Suture removal    Date/Time: 3/17/2021 1:11 PM  Performed by: Onit  Authorized by: Alexis Louis MD   Universal Protocol:  Patient identity confirmed: verbally with patient        Location:     Laterality:  Left    Location:  [address] location:  Ear    Ear location:  L ear  Procedure details: Tools used:  Suture removal kit    Wound appearance:  No sign(s) of infection    Number of sutures removed:  4  Post-procedure details:     Post-removal:  No dressing applied    Patient tolerance of procedure:   Tolerated well, no immediate complications        Scribe Attestation    I,:  Onit am acting as a scribe while in the presence of the attending physician :       I,:  Alexis Louis MD personally performed the services described in this documentation    as scribed in my presence :

## 2021-05-11 ENCOUNTER — APPOINTMENT (EMERGENCY)
Dept: RADIOLOGY | Facility: HOSPITAL | Age: 81
End: 2021-05-11
Payer: COMMERCIAL

## 2021-05-11 ENCOUNTER — HOSPITAL ENCOUNTER (EMERGENCY)
Facility: HOSPITAL | Age: 81
Discharge: HOME | End: 2021-05-11
Attending: EMERGENCY MEDICINE
Payer: COMMERCIAL

## 2021-05-11 VITALS
OXYGEN SATURATION: 97 % | DIASTOLIC BLOOD PRESSURE: 90 MMHG | HEART RATE: 93 BPM | RESPIRATION RATE: 18 BRPM | SYSTOLIC BLOOD PRESSURE: 177 MMHG | WEIGHT: 206.79 LBS | TEMPERATURE: 99.1 F
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
## 2021-05-11 PROBLEM — S42.292A CLOSED FRACTURE OF HEAD OF LEFT HUMERUS: Status: ACTIVE | Noted: 2021-05-11

## 2021-05-11 LAB
BASE EXCESS BLDA CALC-SCNC: 2 MMOL/L (ref -2–3)
GLUCOSE SERPL-MCNC: 118 MG/DL (ref 65–140)
HCO3 BLDA-SCNC: 27.9 MMOL/L (ref 24–30)
HCT VFR BLD CALC: 42 % (ref 36.5–49.3)
HGB BLDA-MCNC: 14.3 G/DL (ref 12–17)
PCO2 BLD: 29 MMOL/L (ref 21–32)
PCO2 BLD: 45.5 MM HG (ref 42–50)
PH BLD: 7.4 [PH] (ref 7.3–7.4)
PO2 BLD: 32 MM HG (ref 35–45)
POTASSIUM BLD-SCNC: 3.9 MMOL/L (ref 3.5–5.3)
SAO2 % BLD FROM PO2: 60 % (ref 60–85)
SODIUM BLD-SCNC: 142 MMOL/L (ref 136–145)
SPECIMEN SOURCE: ABNORMAL

## 2021-05-11 PROCEDURE — 71045 X-RAY EXAM CHEST 1 VIEW: CPT

## 2021-05-11 PROCEDURE — 90471 IMMUNIZATION ADMIN: CPT

## 2021-05-11 PROCEDURE — 73030 X-RAY EXAM OF SHOULDER: CPT

## 2021-05-11 PROCEDURE — 76705 ECHO EXAM OF ABDOMEN: CPT | Performed by: NURSE PRACTITIONER

## 2021-05-11 PROCEDURE — 82803 BLOOD GASES ANY COMBINATION: CPT

## 2021-05-11 PROCEDURE — 82947 ASSAY GLUCOSE BLOOD QUANT: CPT

## 2021-05-11 PROCEDURE — 90715 TDAP VACCINE 7 YRS/> IM: CPT | Performed by: SURGERY

## 2021-05-11 PROCEDURE — NC001 PR NO CHARGE: Performed by: EMERGENCY MEDICINE

## 2021-05-11 PROCEDURE — NC001 PR NO CHARGE: Performed by: PHYSICIAN ASSISTANT

## 2021-05-11 PROCEDURE — 73020 X-RAY EXAM OF SHOULDER: CPT

## 2021-05-11 PROCEDURE — 93308 TTE F-UP OR LMTD: CPT | Performed by: NURSE PRACTITIONER

## 2021-05-11 PROCEDURE — NC001 PR NO CHARGE: Performed by: SURGERY

## 2021-05-11 PROCEDURE — 99284 EMERGENCY DEPT VISIT MOD MDM: CPT | Performed by: SURGERY

## 2021-05-11 PROCEDURE — 84132 ASSAY OF SERUM POTASSIUM: CPT

## 2021-05-11 PROCEDURE — 12002 RPR S/N/AX/GEN/TRNK2.6-7.5CM: CPT | Performed by: SURGERY

## 2021-05-11 PROCEDURE — 99284 EMERGENCY DEPT VISIT MOD MDM: CPT

## 2021-05-11 PROCEDURE — 84295 ASSAY OF SERUM SODIUM: CPT

## 2021-05-11 PROCEDURE — 85014 HEMATOCRIT: CPT

## 2021-05-11 RX ORDER — METHOCARBAMOL 500 MG/1
500 TABLET, FILM COATED ORAL EVERY 6 HOURS PRN
Status: DISCONTINUED | OUTPATIENT
Start: 2021-05-11 | End: 2021-05-11 | Stop reason: HOSPADM

## 2021-05-11 RX ORDER — OXYCODONE HYDROCHLORIDE 5 MG/1
5 TABLET ORAL EVERY 4 HOURS PRN
Qty: 30 TABLET | Refills: 0 | Status: SHIPPED | OUTPATIENT
Start: 2021-05-11 | End: 2021-05-21

## 2021-05-11 RX ORDER — GINSENG 100 MG
1 CAPSULE ORAL 2 TIMES DAILY
Status: DISCONTINUED | OUTPATIENT
Start: 2021-05-11 | End: 2021-05-11 | Stop reason: HOSPADM

## 2021-05-11 RX ORDER — LIDOCAINE HYDROCHLORIDE 10 MG/ML
INJECTION, SOLUTION EPIDURAL; INFILTRATION; INTRACAUDAL; PERINEURAL
Status: COMPLETED
Start: 2021-05-11 | End: 2021-05-11

## 2021-05-11 RX ORDER — OXYCODONE HYDROCHLORIDE 5 MG/1
5 TABLET ORAL EVERY 6 HOURS PRN
Qty: 30 TABLET | Refills: 0 | Status: CANCELLED | OUTPATIENT
Start: 2021-05-11 | End: 2021-05-21

## 2021-05-11 RX ORDER — LIDOCAINE HYDROCHLORIDE 10 MG/ML
10 INJECTION, SOLUTION EPIDURAL; INFILTRATION; INTRACAUDAL; PERINEURAL ONCE
Status: COMPLETED | OUTPATIENT
Start: 2021-05-11 | End: 2021-05-11

## 2021-05-11 RX ORDER — ACETAMINOPHEN 325 MG/1
975 TABLET ORAL EVERY 8 HOURS PRN
Refills: 0
Start: 2021-05-11

## 2021-05-11 RX ORDER — METHOCARBAMOL 500 MG/1
500 TABLET, FILM COATED ORAL 4 TIMES DAILY
Qty: 30 TABLET | Refills: 0 | Status: SHIPPED | OUTPATIENT
Start: 2021-05-11

## 2021-05-11 RX ORDER — OXYCODONE HYDROCHLORIDE AND ACETAMINOPHEN 5; 325 MG/1; MG/1
1 TABLET ORAL EVERY 4 HOURS PRN
Status: DISCONTINUED | OUTPATIENT
Start: 2021-05-11 | End: 2021-05-11

## 2021-05-11 RX ORDER — OXYCODONE HYDROCHLORIDE 5 MG/1
5 TABLET ORAL EVERY 4 HOURS PRN
Status: DISCONTINUED | OUTPATIENT
Start: 2021-05-11 | End: 2021-05-11 | Stop reason: HOSPADM

## 2021-05-11 RX ADMIN — BACITRACIN ZINC 1 LARGE APPLICATION: 500 OINTMENT TOPICAL at 18:38

## 2021-05-11 RX ADMIN — LIDOCAINE HYDROCHLORIDE 10 ML: 10 INJECTION, SOLUTION EPIDURAL; INFILTRATION; INTRACAUDAL; PERINEURAL at 17:54

## 2021-05-11 RX ADMIN — TETANUS TOXOID, REDUCED DIPHTHERIA TOXOID AND ACELLULAR PERTUSSIS VACCINE, ADSORBED 0.5 ML: 5; 2.5; 8; 8; 2.5 SUSPENSION INTRAMUSCULAR at 16:08

## 2021-05-11 RX ADMIN — LIDOCAINE HYDROCHLORIDE 10 ML: 10 INJECTION, SOLUTION EPIDURAL; INFILTRATION; INTRACAUDAL at 17:54

## 2021-05-11 RX ADMIN — OXYCODONE HYDROCHLORIDE 5 MG: 5 TABLET ORAL at 20:00

## 2021-05-11 NOTE — H&P
[facility]  H&P- Clemencia Mcburney  1940, [de-identified] y o  male MRN: [id number]  Unit/Bed#: [place] [address] Encounter: [id number]  Primary Care Provider: Bill Alvarenga MD   Date and time admitted to hospital: 5/11/2021  3:52 PM    Closed fracture of head of left humerus  Assessment & Plan  · Left mildly impacted fracture of the humeral head  · With severe pain on passive range of motion  · Discussed with Dr Jw Ignacio who reviewed the films and recommended treatment with sling and outpatient orthopedic follow-up  · Patient given Percocet in the ER and discharged with oxycodone and Robaxin  · Pain well controlled with sling and pain regimen    [address]  · Fall from 2-3 feet  · On Eliquis for history of Afib  · With left humeral head fracture and bilateral elbow lacerations  · Elbow lacerations repaired bedside -see separate procedure note  · Pain control with oxycodone, acetaminophen, and muscle relaxer  · Left arm in sling okay to remove for hygiene  · Outpatient orthopedic follow-up  · Appointment made for suture removal at surgery office    H&P Exam - Trauma   Clemencia Mcburney  [de-identified] y o  male MRN: [id number]  Unit/Bed#: [place] [address] Encounter: [id number]    Assessment/Plan   Trauma Alert: Level B  Model of Arrival: Ambulance  Trauma Team: Attending To and LISA Cadena  Consultants: None    Chief Complaint:  Left shoulder pain    History of Present Illness   HPI:  Clemencia Mcburney  is a [de-identified] y o  male with history of atrial fibrillation that he takes Eliquis for, presenting today for evaluation after suffering a fall  He describes falling off of a 2-3 foot wall while attempting to take off his pool cover  He states that he fell onto his left side, impacting his left shoulder and a bracing his elbows while up trying to roll out of it  He denies head strike  He is currently complaining of left shoulder pain and elbow abrasions  Otherwise he denies injury    Mechanism:Fall    Review of Systems Constitutional: Negative  HENT: Negative  Eyes: Negative  Respiratory: Negative  Cardiovascular: Negative  Gastrointestinal: Negative  Negative for abdominal pain, diarrhea, nausea and vomiting  Endocrine: Negative  Genitourinary: Negative  Musculoskeletal: Positive for arthralgias  Negative for back pain (Left shoulder), gait problem, joint swelling, neck pain and neck stiffness  Skin: Positive for wound ( abrasions to bilateral elbows)  Allergic/Immunologic: Negative  Neurological: Negative  Negative for dizziness, speech difficulty, weakness, light-headedness, numbness and headaches  Hematological: Negative  Psychiatric/Behavioral: Negative  Negative for confusion and decreased concentration  12-point, complete review of systems was reviewed and negative except as stated above  Historical Information   Efforts to obtain history included the following sources: other medical personnel, obtained from other records    Past Medical History:   Diagnosis Date    Hypertension      Past Surgical History:   Procedure Laterality Date    GALLBLADDER SURGERY       Social History   Social History     Substance and Sexual Activity   Alcohol Use Yes    Comment: freq     Social History     Substance and Sexual Activity   Drug Use Not Currently     Social History     Tobacco Use   Smoking Status Unknown If Ever Smoked     E-Cigarette/Vaping     E-Cigarette/Vaping Substances     Immunization History   Administered Date(s) Administered    Tdap 05/11/2021     Last Tetanus:  Unknown  Updated    Family History: Non-co ntributory      Meds/Allergies   all current active meds have been reviewed    No Known Allergies      PHYSICAL EXAM      Objective   Vitals:   First set: Temperature: (!) 96 7 °F (35 9 °C) (05/11/21 1555)  Pulse: 63 (05/11/21 1555)  Respirations: 18 (05/11/21 1554)  Blood Pressure: 155/92 (05/11/21 1555)    Primary Survey:   (A) Airway:  Patent  (B) Breathing:  Clear to auscultation, bilaterally  (C) Circulation: Pulses:   Normal +2 radials and dorsalis pedis pulses, bilaterally  (D) Disabliity:  GCS Total:  15  (E) Expose:  Completed    Secondary Survey: (Click on Physical Exam tab above)  Physical Exam  Constitutional:       General: He is not in acute distress  Appearance: He is not ill-appearing or diaphoretic  HENT:      Head: Normocephalic and atraumatic  Comments: Face is nontender  Right Ear: External ear normal       Left Ear: External ear normal       Nose: Nose normal       Mouth/Throat:      Mouth: Mucous membranes are moist       Pharynx: Oropharynx is clear  Eyes:      Pupils: Pupils are equal, round, and reactive to light  Neck:      Musculoskeletal: Normal range of motion and neck supple  No neck rigidity or muscular tenderness  Comments: Neck is nontender  He displays full range of motion of neck  C-spine was cleared with nexus criteria  Cardiovascular:      Rate and Rhythm: Normal rate  Rhythm irregular  Pulses: Normal pulses  Heart sounds: Normal heart sounds  No murmur  No friction rub  No gallop  Pulmonary:      Effort: Pulmonary effort is normal  No respiratory distress  Breath sounds: Normal breath sounds  No stridor  No wheezing, rhonchi or rales  Chest:      Chest wall: No tenderness  Abdominal:      General: Bowel sounds are normal  There is no distension  Palpations: Abdomen is soft  Tenderness: There is no abdominal tenderness  There is no guarding  Genitourinary:     Comments: Pelvis is stable  Musculoskeletal:      Right shoulder: Normal       Left shoulder: He exhibits decreased range of motion, tenderness (Anterior tenderness), pain and decreased strength  He exhibits no bony tenderness, no swelling, no effusion, no crepitus, no deformity, no laceration and no spasm  Right elbow: He exhibits laceration (1 cm laceration to dorsum of elbow)   He exhibits normal range of motion, no swelling, no effusion and no deformity  No tenderness found  Left elbow: He exhibits laceration (1 5 center laceration to dorsum of elbow)  He exhibits normal range of motion, no swelling, no effusion and no deformity  No tenderness found  Comments: No C, T, L-spine tenderness  No step-offs  No crepitus palpable on his back  No extremity deformity noted  Limited range of motion in left shoulder- no sign of deformity or gross dislocation  Trauma [place] XR is unremarkable  Patient cringes and complains of pain with abduction of shoulder  With the exception of left shoulder, patient displays full range of motion of all other extremities  Skin:     General: Skin is warm  Capillary Refill: Capillary refill takes less than 2 seconds  Findings: Abrasion and laceration present  Neurological:      General: No focal deficit present  Mental Status: He is alert and oriented to person, place, and time  GCS: GCS eye subscore is 4  GCS verbal subscore is 5  GCS motor subscore is 6  Sensory: Sensation is intact  Motor: Motor function is intact  Coordination: Coordination is intact  Psychiatric:         Attention and Perception: Attention and perception normal          Mood and Affect: Mood normal          Speech: Speech normal          Behavior: Behavior normal  Behavior is cooperative  Invasive Devices     None                 Lab Results: Results: I have personally reviewed pertinent reports     and ISTAT: No components found for: VBG  Imaging/EKG Studies: Results: I have personally reviewed pertinent reports   , Extremities: Left shoulder mildly impacted left humeral head fracture  Other Studies: none    Code Status: No Order  Advance Directive and Living Will:      Power of :    POLST:

## 2021-05-11 NOTE — DISCHARGE INSTRUCTIONS
Traumatic Laceration and Wound Care Instructions:     Wound Care:  - Wash laceration/wound daily, gently in the shower, do not scrub  Pat dry with clean towel  Do NOT immerse completely in water (i e  tub or swimming pool) until cleared by trauma  - Follow-up with the Trauma Service as directed for suture/staple removal   - Call office if you develop fever/chills, redness/swelling/drainage from the site  Additional Instructions:  - If you have any questions or concerns after discharge please call the office   - Call office or return to ER if fever greater than 101, chills, increasing redness/swelling at site of laceration/wound, purulent or foul smelling drainage from laceration/wound, and/or worsening/uncontrollable pain  Discharge Instructions - Orthopedics      Weight Bearing Status:                                           - non weight bearing Left arm, keep in sling at all times, may remove for hygiene and replace    Pain  - You have been prescribed a narcotic medication for pain as well as a muscle relaxer  Both can make you sleepy  Do not drive while taking these medications  Appt Instructions:   - If you do not have your appointment, please call the [facility] at [phone] to schedule an appointment as instructed  - Otherwise, followup as scheduled  Contact the office sooner if you experience any increased numbness/tingling in the extremities

## 2021-05-11 NOTE — ED PROVIDER NOTES
Emergency Department Airway Evaluation and Management Form    History  Obtained from:  Patient himself  Patient has no known allergies  Chief Complaint   Patient presents with   Barron Fall     pt was getting pool cover off and fell, fell off aprox 2-3 feet  no head strike  +eliquis  HPI  fell off a 2 ft high surface, landed on his left side, elbow and shoulder strike, on Eliquis, denies head injury    Past Medical History:   Diagnosis Date    Hypertension      Past Surgical History:   Procedure Laterality Date    GALLBLADDER SURGERY       History reviewed  No pertinent family history  Social History     Tobacco Use    Smoking status: Unknown If Ever Smoked   Substance Use Topics    Alcohol use: Yes     Comment: freq    Drug use: Not Currently     I have reviewed and agree with the history as documented      Review of Systems    Physical Exam  /92   Pulse 63   Temp (!) 96 7 °F (35 9 °C) (Tympanic)   Resp 18   Wt 93 8 kg (206 lb 12 7 oz)   SpO2 96%     Physical Exam airway intact speaking full clear sentences, clear bilateral breath sounds    ED Medications  Medications   tetanus-diphtheria-acellular pertussis (BOOSTRIX) IM injection 0 5 mL (has no administration in time range)       Intubation  Procedures    Notes  No emergent airway intervention, care per primary trauma team    Final Diagnosis  Final diagnoses:   None       ED Provider  Electronically Signed by     Ignacio Sotelo DO  05/11/21 6350

## 2021-05-11 NOTE — PROCEDURES
Laceration repair    Date/Time: 5/11/2021 6:36 PM  Performed by: Carrie Shankar PA-C  Authorized by: Carrie Shankar PA-C   Consent: Verbal consent obtained  Risks and benefits: risks, benefits and alternatives were discussed  Consent given by: patient  Patient understanding: patient states understanding of the procedure being performed  Patient consent: the patient's understanding of the procedure matches consent given  Procedure consent: procedure consent matches procedure scheduled  Patient identity confirmed: verbally with patient  Time out: Immediately prior to procedure a "time out" was called to verify the correct patient, procedure, equipment, support staff and site/side marked as required  Body area: upper extremity (bilateral elbows)  Wound length (cm): Right 3cm left 2cm  Tendon involvement: none  Nerve involvement: none  Vascular damage: no  Anesthesia: local infiltration    Anesthesia:  Local Anesthetic: lidocaine 1% without epinephrine    Sedation:  Patient sedated: no      Wound Dehiscence:  Superficial Wound Dehiscence: simple closure      Procedure Details:  Irrigation solution: saline  Irrigation method: syringe  Amount of cleaning: standard  Skin closure: 4-0 Prolene  Number of sutures: Right 5 Left 3    Technique: simple  Approximation: close  Approximation difficulty: simple  Dressing: 4x4 sterile gauze, antibiotic ointment and gauze roll  Patient tolerance: patient tolerated the procedure well with no immediate complications

## 2021-05-11 NOTE — PROCEDURES
POC FAST US    Date/Time: 5/11/2021 4:14 PM  Performed by: CHONG Vuong  Authorized by: CHONG Vuong     Patient location:  ED  Other Assisting Provider: No    Procedure details:     Exam Type:  Diagnostic    Indications: blunt abdominal trauma      Assess for:  Intra-abdominal fluid and pericardial effusion    Technique: FAST      Views obtained:  Heart - Pericardial sac, RUQ - Lewis's Pouch, LUQ - Splenorenal space and Suprapubic - Pouch of Brian    Image quality: diagnostic      Image availability:  Images available in PACS  FAST Findings:     RUQ (Hepatorenal) free fluid: absent      LUQ (Splenorenal) free fluid: absent      Suprapubic free fluid: absent      Cardiac wall motion: identified      Pericardial effusion: absent    Interpretation:     Impressions: negative

## 2021-05-11 NOTE — CASE MANAGEMENT
SW responded to the level B trauma alert  Patient was brought in by EMS following a fall that he sustained while opening his pool  Patient was alert and talking upon arrival  No immediate CM needs identified at this time  CM dept will continue to follow the Patient

## 2021-05-11 NOTE — ASSESSMENT & PLAN NOTE
· Fall from 2-3 feet     · On Eliquis for history of Afib  · With left humeral head fracture and bilateral elbow lacerations  · Elbow lacerations repaired bedside -see separate procedure note  · Pain control with oxycodone, acetaminophen, and muscle relaxer  · Left arm in sling okay to remove for hygiene  · Outpatient orthopedic follow-up  · Appointment made for suture removal at surgery office

## 2021-05-12 NOTE — ASSESSMENT & PLAN NOTE
· Left mildly impacted fracture of the humeral head  · With severe pain on passive range of motion  · Discussed with Dr Ruby Robbins who reviewed the films and recommended treatment with sling and outpatient orthopedic follow-up  · Patient given Percocet in the ER and discharged with oxycodone and Robaxin  · Pain well controlled with sling and pain regimen

## 2021-05-14 ENCOUNTER — OFFICE VISIT (OUTPATIENT)
Dept: OBGYN CLINIC | Facility: CLINIC | Age: 81
End: 2021-05-14
Payer: COMMERCIAL

## 2021-05-14 VITALS
HEART RATE: 94 BPM | SYSTOLIC BLOOD PRESSURE: 147 MMHG | WEIGHT: 190 LBS | DIASTOLIC BLOOD PRESSURE: 101 MMHG | BODY MASS INDEX: 27.26 KG/M2

## 2021-05-14 DIAGNOSIS — S42.202A CLOSED FRACTURE OF PROXIMAL END OF LEFT HUMERUS, UNSPECIFIED FRACTURE MORPHOLOGY, INITIAL ENCOUNTER: Primary | ICD-10-CM
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
## 2021-05-14 PROBLEM — H91.90 HEARING LOSS: Status: ACTIVE | Noted: 2021-05-14

## 2021-05-14 PROBLEM — E78.1 PURE HYPERTRIGLYCERIDEMIA: Status: ACTIVE | Noted: 2021-05-14

## 2021-05-14 PROBLEM — H43.819 VITREOUS DEGENERATION: Status: ACTIVE | Noted: 2021-05-14

## 2021-05-14 PROBLEM — I25.10 CORONARY ARTERY DISEASE: Status: ACTIVE | Noted: 2021-05-14

## 2021-05-14 PROBLEM — K85.10 GALLSTONE PANCREATITIS: Status: ACTIVE | Noted: 2020-02-16

## 2021-05-14 PROBLEM — I05.9 MITRAL VALVE DISEASE: Status: ACTIVE | Noted: 2021-05-14

## 2021-05-14 PROBLEM — E44.0 MODERATE PROTEIN-CALORIE MALNUTRITION (HCC): Status: ACTIVE | Noted: 2020-02-25

## 2021-05-14 PROBLEM — C43.9 MALIGNANT MELANOMA OF SKIN (HCC): Status: ACTIVE | Noted: 2021-05-14

## 2021-05-14 PROBLEM — M10.9 GOUT: Status: ACTIVE | Noted: 2021-05-14

## 2021-05-14 PROBLEM — H40.9 GLAUCOMA: Status: ACTIVE | Noted: 2021-05-14

## 2021-05-14 PROBLEM — D12.6 TUBULOVILLOUS ADENOMA POLYP OF COLON: Status: ACTIVE | Noted: 2019-03-29

## 2021-05-14 PROBLEM — F52.8 PSYCHOSEXUAL DYSFUNCTION WITH INHIBITED SEXUAL EXCITEMENT: Status: ACTIVE | Noted: 2021-05-14

## 2021-05-14 PROCEDURE — 99203 OFFICE O/P NEW LOW 30 MIN: CPT | Performed by: ORTHOPAEDIC SURGERY

## 2021-05-14 PROCEDURE — 23600 CLTX PROX HUMRL FX W/O MNPJ: CPT | Performed by: ORTHOPAEDIC SURGERY

## 2021-05-14 NOTE — PROGRESS NOTES
Patient Name:  Ace Fan  MRN:  [id number]    Assessment & Plan     Left minimally displaced humeral head fracture  1  Patient was advised to be nonweightbearing to the left upper extremity   2  Sling was adjusted in the office and was demonstrated to himself as well as the wife how to properly place on a sling  3  Can take Tylenol anti-inflammatories as needed for pain   4  He was advised that we will start physical therapy at the end of the month to work on gentle range of motion  5  He can come out of the sling to work on elbow and wrist range of motion   6  He will follow up in 4 weeks for re-evaluation and x-rays of the left shoulder    Chief Complaint     Left shoulder pain    History of the Present Illness     Ace Fan  is a [de-identified] y o  male presents today for initial evaluation of left shoulder pain  Patient's initial injury occurred on 05/11/2021  He states that he fell approximately 2-3 feet as he was getting his pool cover off  He states that he landed onto his left side including the shoulder, and elbow  Patient is on Eliquis  He denies hitting his head  He denies any loss of consciousness  Today he states that he had significant pain at nighttime with sleeping  He states it was very difficult to get comfortable  He denies any numbness or tingling into the extremity  Physical Exam     BP (!) 147/101   Pulse 94   Wt 86 2 kg (190 lb)   BMI 27 26 kg/m²     Left  shoulder:  Tenderness:  Minimal tenderness over the anterior aspect of the shoulder  Range of motion:   Limited range of motion secondary to fracture  Strength:  Strength was not tested secondary to fracture  Impingement signs:  Negative  Empty can test:  Negative  Speed's test:  Negative  Cross-body adduction test:  Negative    Eyes:  Anicteric sclerae  ENT:  Trachea midline  Lungs:  Normal respiratory effort  Cardiovascular:  Capillary refill is less than 2 seconds  Lymphatic:  No palpable lymphadenopathy    Skin: Intact without erythema  Mild ecchymosis left arm  Neurologic:  Sensation grossly intact to light touch  Psychiatric:  Mood and affect are appropriate  Data Review     I have personally reviewed pertinent films in PACS, and my interpretation follows:    X-rays of the left shoulder from 5/11/2021 demonstrate a nondisplaced proximal humerus fracture  Past Medical History:   Diagnosis Date    Basal cell carcinoma 2005    Right Breast     BCC (basal cell carcinoma) 03/10/2021    BCC- Left ear lobe Dr Bond Knife     Hypertension     Melanoma ([facility] [address] ) 2005    Mid Abdomen        Past Surgical History:   Procedure Laterality Date    GALLBLADDER SURGERY      MOHS SURGERY Left 03/10/2021    BCC, Left earlobe, Dr Amalia Padilla  2005    right breast     SKIN CANCER EXCISION  2005    mid abdomen        No Known Allergies    Current Outpatient Medications on File Prior to Visit   Medication Sig Dispense Refill    acetaminophen (TYLENOL) 325 mg tablet Take 3 tablets (975 mg total) by mouth every 8 (eight) hours as needed for mild pain  0    apixaban (ELIQUIS) 5 mg Take 5 mg by mouth 2 (two) times a day      Cholecalciferol 50 MCG (2000 UT) CAPS Take 1 capsule by mouth      losartan (COZAAR) 50 mg tablet Take 50 mg by mouth daily      methocarbamol (ROBAXIN) 500 mg tablet Take 1 tablet (500 mg total) by mouth 4 (four) times a day 30 tablet 0    metoprolol tartrate (LOPRESSOR) 25 mg tablet take 1 tablet by mouth twice a day      oxyCODONE (ROXICODONE) 5 mg immediate release tablet Take 1 tablet (5 mg total) by mouth every 4 (four) hours as needed for moderate pain for up to 10 daysMax Daily Amount: 30 mg 30 tablet 0    simvastatin (ZOCOR) 10 mg tablet Take 10 mg by mouth every evening      Uloric 40 MG tablet        No current facility-administered medications on file prior to visit          Social History     Tobacco Use    Smoking status: Current Every Day Smoker    Smokeless tobacco: Never Used   Substance Use Topics    Alcohol use: Yes     Frequency: Never     Comment: freq    Drug use: Not Currently       Family History   Problem Relation Age of Onset    No Known Problems Mother     No Known Problems Father        Review of Systems     As stated in the HPI  All other systems were reviewed and are negative        Fracture / Dislocation Treatment    Date/Time: 5/14/2021 10:15 AM  Performed by: Shirley Francis MD  Authorized by: Shirley Francis MD     Injury location:  Shoulder  Location details:  Left shoulder  Injury type:  Fracture  Fracture type: surgical neck    Manipulation performed?: No             Scribe Attestation    I,:  John Ngo PA-C am acting as a scribe while in the presence of the attending physician :       I,:  Shirley Francis MD personally performed the services described in this documentation    as scribed in my presence :

## 2021-05-28 ENCOUNTER — EVALUATION (OUTPATIENT)
Dept: PHYSICAL THERAPY | Facility: REHABILITATION | Age: 81
End: 2021-05-28
Payer: COMMERCIAL

## 2021-05-28 DIAGNOSIS — S42.202A CLOSED FRACTURE OF PROXIMAL END OF LEFT HUMERUS, UNSPECIFIED FRACTURE MORPHOLOGY, INITIAL ENCOUNTER: ICD-10-CM

## 2021-05-28 PROCEDURE — 97110 THERAPEUTIC EXERCISES: CPT | Performed by: PHYSICAL THERAPIST

## 2021-05-28 PROCEDURE — 97162 PT EVAL MOD COMPLEX 30 MIN: CPT | Performed by: PHYSICAL THERAPIST

## 2021-05-28 NOTE — PROGRESS NOTES
PT Evaluation     Today's date: 2021  Patient name: Meagan Chiu  : 1940  MRN: [id number]  Referring provider: Ramesh Hannah MD  Dx:   Encounter Diagnosis     ICD-10-CM    1  Closed fracture of proximal end of left humerus, unspecified fracture morphology, initial encounter  S42  Ambulatory referral to Physical Therapy       Start Time: 1110  Stop Time: 1155  Total time in clinic (min): 45 minutes    Assessment  Assessment details: Meagan Chiu  is a [de-identified] y o  male presenting to outpatient physical therapy on 21 with referral from MD for left humeral fx  Upon evaluation, Alden Gonzalez demonstrates impaired shoulder AROM, PROM, strength and pain  The listed impairments and functional limitation are effecting Alden Gonzalez ability to function at prior level  They can continue to benefit from physical therapy services at this times in order to address the above discussed impairments and functional limitation in order to allow for a return to premorbid status     Impairments: abnormal muscle firing, abnormal muscle tone, abnormal or restricted ROM, abnormal movement, activity intolerance, impaired physical strength and pain with function  Understanding of Dx/Px/POC: good   Prognosis: good    Plan  Patient would benefit from: skilled PT  Planned modality interventions: thermotherapy: hydrocollator packs  Planned therapy interventions: joint mobilization, manual therapy, ADL training, neuromuscular re-education, home exercise program, therapeutic exercise, therapeutic activities, strengthening, patient education and functional ROM exercises  Frequency: 2x week  Duration in weeks: 8  Treatment plan discussed with: patient        Subjective Evaluation    History of Present Illness  Mechanism of injury: Alden Gonzalez is a [de-identified] y o  male presenting to physical therapy on 21 with referral from MD for after L humeral head fx on 2021 after a fall when taking his pool over off      Denies any numbness in hand/arm  Not a recurrent problem   Quality of life: good    Pain  Current pain ratin  At worst pain ratin  Location: left shoudler - lateral arm      Diagnostic Tests  X-ray: abnormal  Patient Goals  Patient goals for therapy: decreased pain, increased strength, increased motion, independence with ADLs/IADLs and return to sport/leisure activities      Short Term Goals:   1  Patient will be Independent with hep  2  Patient will improve pain with activity by 50%  3  Patient will improve PROM to 50% of normal      Long Term Goals:   1  Patient will improve FOTO to greater then goal  2  Patient will improve pain with activity to 2/10 or less  3  Patient will continue with HEP independence to allow for decreased future reoccurrence of pain and loss in function  4  Patient will be able to functionally reach to back of head  5 Patient will reaching flexion AROM to 120 deg or greater  Objective      Dermatome: (pinprick- L/R): normal                      Palpation:TTP anterior, significant bruising noted anterior bicep, into pec region           MMT         AROM          PROM    Shoulder       L       R        L           R      L     R   Flex  NT  NT  50    Abd  NT  NT  70    IR  NT  NT  0    ER   NT  NT  15 deg @ 45 deg ABD             Rhomboid         Mid Trap         Low Trap         Serratus         Infraspnatus         Teres Major         Sub Scap                 Segmental mobility: GHJ:  hypomobile post, inferior glides       Precautions: L humeral fx, NWB      Manuals             Shoulder stretching                                                    Neuro Re-Ed                                                                                                        Ther Ex             Scap retractions             pendulums             Elbow AROM             PROM to tolerance             Table slides - flexion                                                    Ther Activity Gait Training                                       Modalities

## 2021-06-01 ENCOUNTER — CLINICAL SUPPORT (OUTPATIENT)
Dept: SURGERY | Facility: CLINIC | Age: 81
End: 2021-06-01

## 2021-06-01 DIAGNOSIS — S51.019A ELBOW LACERATION: Primary | ICD-10-CM

## 2021-06-01 PROCEDURE — 99024 POSTOP FOLLOW-UP VISIT: CPT | Performed by: SURGERY

## 2021-06-02 NOTE — PROGRESS NOTES
03/20/20 2253   Child Life   Location ED   Intervention Initial Assessment;Developmental Play;Preparation   Preparation Comment stitches   Anxiety Appropriate   Techniques to Bulger with Loss/Stress/Change diversional activity;family presence   Able to Shift Focus From Anxiety Easy   Outcomes/Follow Up Provided Materials;Continue to Follow/Support   Self and services introduced to patient and patient's family. Patient tearful in room, easily engaged with writer. Provided age appropriate preparation for stitches. Stephanie coped well with procedure.   Subjective     Shonda Hernandes Sr  is a [de-identified] y o  male who obtained a laceration b/l elbows, which required closure with 3 sutures each laceration  Mechanism of injury: fall  He denies pain, redness, or drainage from the wound  Review of Systems      Objective     There were no vitals taken for this visit  Injury exam:  A b/l lacerations noted on the elbows are healing well,without evidence of infection  Assessment/Plan     Lacerations are healing well, without evidence of infection  1 3 sutures were removed from each b/l elbow lacerations  2  Wound care discussed  3  Follow up as needed

## 2021-06-04 ENCOUNTER — APPOINTMENT (OUTPATIENT)
Dept: RADIOLOGY | Facility: AMBULARY SURGERY CENTER | Age: 81
End: 2021-06-04
Attending: ORTHOPAEDIC SURGERY
Payer: COMMERCIAL

## 2021-06-04 ENCOUNTER — OFFICE VISIT (OUTPATIENT)
Dept: OBGYN CLINIC | Facility: CLINIC | Age: 81
End: 2021-06-04

## 2021-06-04 VITALS
BODY MASS INDEX: 26.77 KG/M2 | WEIGHT: 187 LBS | HEIGHT: 70 IN | DIASTOLIC BLOOD PRESSURE: 117 MMHG | SYSTOLIC BLOOD PRESSURE: 181 MMHG | HEART RATE: 71 BPM

## 2021-06-04 DIAGNOSIS — S42.202D CLOSED FRACTURE OF PROXIMAL END OF LEFT HUMERUS WITH ROUTINE HEALING, UNSPECIFIED FRACTURE MORPHOLOGY, SUBSEQUENT ENCOUNTER: ICD-10-CM

## 2021-06-04 DIAGNOSIS — S42.202D CLOSED FRACTURE OF PROXIMAL END OF LEFT HUMERUS WITH ROUTINE HEALING, UNSPECIFIED FRACTURE MORPHOLOGY, SUBSEQUENT ENCOUNTER: Primary | ICD-10-CM

## 2021-06-04 PROBLEM — S42.292A CLOSED FRACTURE OF HEAD OF LEFT HUMERUS: Status: RESOLVED | Noted: 2021-05-11 | Resolved: 2021-06-04

## 2021-06-04 PROCEDURE — 99024 POSTOP FOLLOW-UP VISIT: CPT | Performed by: ORTHOPAEDIC SURGERY

## 2021-06-04 PROCEDURE — 73030 X-RAY EXAM OF SHOULDER: CPT

## 2021-06-04 NOTE — PROGRESS NOTES
Patient Name:  Shoaib Barclay  MRN:  [id number]    Assessment & Plan     Left proximal humerus fracture 5/11/2021  1  Continue physical therapy  2  Discontinue sling  3  Cleared to drive at his discretion  4  Follow up in 6 weeks with x-rays left shoulder  History of the Present Illness     [de-identified] y/o male who presents today for a follow up evaluation for his left shoulder  Patient had a fall on 5/11/2021 resulting in a humeral head fracture  Today, patient reports improving pain/symptoms about the shoulder along with improving ROM  He has been performing physical therapy/HEP with benefit  He takes Tylenol prn for pain relief with benefit  General ROS:  Negative for fever or chills  Neurological ROS:  Negative for numbness or tingling  Physical Exam     BP (!) 181/117   Pulse 71   Ht 5' 10" (1 778 m)   Wt 84 8 kg (187 lb)   BMI 26 83 kg/m²     Left  shoulder:  Tenderness:  None  Range of motion:   FF:  120   ER-abduction:  60   IR-abduction: 20  Strength testing deferred today due to fracture  CV:  2+ radial pulse  Skin: Intact with moderate ecchymosis about upper arm  No erythema    Data Review     I have personally reviewed pertinent films in PACS, and my interpretation follows  X Ray Left Shoulder 6/4/2021: Healing proximal humerus fracture in stable alignment      Social History     Tobacco Use    Smoking status: Current Every Day Smoker    Smokeless tobacco: Never Used   Substance Use Topics    Alcohol use: Yes     Frequency: Never     Comment: freq    Drug use: Not Currently       Scribe Attestation    I,:  Josy Borja am acting as a scribe while in the presence of the attending physician :       I,:  Luis Burgos MD personally performed the services described in this documentation    as scribed in my presence :

## 2021-06-07 ENCOUNTER — APPOINTMENT (OUTPATIENT)
Dept: PHYSICAL THERAPY | Facility: REHABILITATION | Age: 81
End: 2021-06-07
Payer: COMMERCIAL

## 2021-06-10 ENCOUNTER — OFFICE VISIT (OUTPATIENT)
Dept: PHYSICAL THERAPY | Facility: REHABILITATION | Age: 81
End: 2021-06-10
Payer: COMMERCIAL

## 2021-06-10 DIAGNOSIS — S42.202A CLOSED FRACTURE OF PROXIMAL END OF LEFT HUMERUS, UNSPECIFIED FRACTURE MORPHOLOGY, INITIAL ENCOUNTER: Primary | ICD-10-CM

## 2021-06-10 PROCEDURE — 97110 THERAPEUTIC EXERCISES: CPT | Performed by: PHYSICAL THERAPIST

## 2021-06-10 NOTE — PROGRESS NOTES
Daily Note     Today's date: 6/10/2021  Patient name: Cathryn Tran Sr   : 1940  MRN: [id number]  Referring provider: Sebastian Huston MD  Dx:   Encounter Diagnosis     ICD-10-CM    1  Closed fracture of proximal end of left humerus, unspecified fracture morphology, initial encounter  S4        Start Time: 1215  Stop Time: 1255  Total time in clinic (min): 40 minutes    Subjective: Patient reports that he did follow up with MD, PROM coming along well and elbow AROM doing good  Unable to lift arm at all  Objective: See treatment diary below  PROM: 125 flexion, 100 ABD, 50 deg ER  AAROM in sidelying - flexion ~ 120 deg    Assessment: Tolerated treatment well  Patient does have significant weakness with overhead elevation and ABD as well as a + ER lag  Though early, i am converned for any nerve damage due to such significant weakness  Will continue to minitor and progress PROM as tolerated  Added AAROM cane flexion and ABD as well as supine ER to HEP  Plan: Continue per plan of care        Precautions: L humeral fx, NWB      Manuals 6/10            Shoulder stretching                                                    Neuro Re-Ed                                                                                                        Ther Ex             Farmer carry 6#/7#  40 ft x8            pendulums             Elbow AROM             PROM to tolerance 15'            Table slides - flexion             AAROM - cane flexion/ABD standing - 15x ea            SL row OJB  2x15             SL press out and flexion 2x15 each            Ther Activity                                       Gait Training                                       Modalities

## 2021-06-15 ENCOUNTER — OFFICE VISIT (OUTPATIENT)
Dept: PHYSICAL THERAPY | Facility: REHABILITATION | Age: 81
End: 2021-06-15
Payer: COMMERCIAL

## 2021-06-15 DIAGNOSIS — S42.202A CLOSED FRACTURE OF PROXIMAL END OF LEFT HUMERUS, UNSPECIFIED FRACTURE MORPHOLOGY, INITIAL ENCOUNTER: Primary | ICD-10-CM

## 2021-06-15 PROCEDURE — 97140 MANUAL THERAPY 1/> REGIONS: CPT | Performed by: PHYSICAL THERAPIST

## 2021-06-15 PROCEDURE — 97110 THERAPEUTIC EXERCISES: CPT | Performed by: PHYSICAL THERAPIST

## 2021-06-15 NOTE — PROGRESS NOTES
Daily Note     Today's date: 6/15/2021  Patient name: Phillip Virgen Sr   : 1940  MRN: [id number]  Referring provider: Doyle Mcelroy MD  Dx:   Encounter Diagnosis     ICD-10-CM    1  Closed fracture of proximal end of left humerus, unspecified fracture morphology, initial encounter  S4        Start Time: 1215  Stop Time: 1300  Total time in clinic (min): 45 minutes    Subjective: Patient reports " I just cant move it myself " Pain doing well but just cant move it  Objective: See treatment diary below  + ER lag  + drop arm  + arc sign   Noted atrophy posterior cuff  3-/5 ER/IR MMT    Assessment: Tolerated treatment well  Patient was able to progress AAROM with supine cane work but significant weakness remains as I feel he has a underlying major cuff tear  Plan: Continue per plan of care        Precautions: L humeral fx, NWB      Manuals 6/10 6/15           Shoulder stretching  AB                                                  Neuro Re-Ed                                                                                                        Ther Ex  6/15           Pascal carry 6#/7#  40 ft x8 NP           pendulums  HEP           Supine cane press  3x10           PROM to tolerance 15' 10'           Supine cane flexion  2x10           AAROM - cane flexion/ABD standing - 15x ea            SL row OJB  2x15             Active assisted flexion - axis  3#  2x10           rows  BTB  2x10                                     Ther Activity                                       Gait Training                                       Modalities

## 2021-06-18 ENCOUNTER — OFFICE VISIT (OUTPATIENT)
Dept: PHYSICAL THERAPY | Facility: REHABILITATION | Age: 81
End: 2021-06-18
Payer: COMMERCIAL

## 2021-06-18 DIAGNOSIS — S42.202A CLOSED FRACTURE OF PROXIMAL END OF LEFT HUMERUS, UNSPECIFIED FRACTURE MORPHOLOGY, INITIAL ENCOUNTER: Primary | ICD-10-CM

## 2021-06-18 PROCEDURE — 97110 THERAPEUTIC EXERCISES: CPT | Performed by: PHYSICAL THERAPIST

## 2021-06-18 PROCEDURE — 97112 NEUROMUSCULAR REEDUCATION: CPT | Performed by: PHYSICAL THERAPIST

## 2021-06-18 NOTE — PROGRESS NOTES
Daily Note     Today's date: 2021  Patient name: Momo Camacho Sr   : 1940  MRN: [id number]  Referring provider: Melanie Paige MD  Dx:   Encounter Diagnosis     ICD-10-CM    1  Closed fracture of proximal end of left humerus, unspecified fracture morphology, initial encounter  S4        Start Time: 823  Stop Time: 903  Total time in clinic (min): 40 minutes    Subjective: Patient reports that he went to move a dish from the  when he felt a pop followed by a warm sensation in his shoulder  Got stiff, better today  Denies an bruising  Objective: See treatment diary below  + ER lag  + drop arm  Unable to actively lift arm > 15 deg flexion with a + shrug sign  PROM > 75% of normal all planes    Assessment: Tolerated treatment fair  Patient continues with significant weakness in his shoulder as I am concerned for an underlying RC tear  Will reach out to referring MD        Plan: Continue per plan of care        Precautions: L humeral fx, NWB      Manuals 6/10 6/15 6/18          Shoulder stretching  AB                                                  Neuro Re-Ed                                                                                                        Ther Ex  6/15 6/18          Pascal carry 6#/7#  40 ft x8 NP 8#/7#  40 ft x8          pendulums  HEP           Supine cane press  3x10 2x15          PROM to tolerance 15' 10' 8'          Supine cane flexion  2x10 2x10          AAROM - cane flexion/ABD standing - 15x ea            SL row OJB  2x15             Active assisted flexion - axis  3#  2x10 3#  2x10          rows  BTB  2x10 BTB  2x10          assessment   5'          education   5'          Ther Activity                                       Gait Training                                       Modalities

## 2021-06-22 ENCOUNTER — OFFICE VISIT (OUTPATIENT)
Dept: PHYSICAL THERAPY | Facility: REHABILITATION | Age: 81
End: 2021-06-22
Payer: COMMERCIAL

## 2021-06-22 DIAGNOSIS — S42.202A CLOSED FRACTURE OF PROXIMAL END OF LEFT HUMERUS, UNSPECIFIED FRACTURE MORPHOLOGY, INITIAL ENCOUNTER: Primary | ICD-10-CM

## 2021-06-22 PROCEDURE — 97112 NEUROMUSCULAR REEDUCATION: CPT | Performed by: PHYSICAL THERAPIST

## 2021-06-22 PROCEDURE — 97110 THERAPEUTIC EXERCISES: CPT | Performed by: PHYSICAL THERAPIST

## 2021-06-22 NOTE — PROGRESS NOTES
Daily Note     Today's date: 2021  Patient name: Jessica Padilla Sr   : 1940  MRN: [id number]  Referring provider: Dom Valente MD  Dx:   Encounter Diagnosis     ICD-10-CM    1  Closed fracture of proximal end of left humerus, unspecified fracture morphology, initial encounter  S4        Start Time: 830  Stop Time: 915  Total time in clinic (min): 45 minutes    Subjective: Patient reports that his pain has been controlled, sleeping well  Still with much difficulty lifting arm up but thinks it is better then last session as he has been in his pool and using in the pool  Objective: See treatment diary below      Assessment: Tolerated treatment well  Patient still with significant weakness into forward elevation and ER but did show progress in supine with elevation as he could bottom up press 3# KB  Used 1# with active assisted flexion on axis vs 3 # last session  Plan: Continue per plan of care        Precautions: L humeral fx, NWB      Manuals 6/10 6/15 6/18 6/22         Shoulder stretching  AB                                                  Neuro Re-Ed                                                                                                        Ther Ex  6/15 6/18 6/22         Farmer carry 6#/7#  40 ft x8 NP 8#/7#  40 ft x8 10#/8#  40 ft x8         pendulums  HEP           Supine cane press  3x10 2x15 2x10  10x 2#         PROM to tolerance 15' 10' 8' 5'         Supine cane flexion  2x10 2x10 See above         AAROM - cane flexion/ABD standing - 15x ea            Supine KB press OJB  2x15    3#  3x6         Active assisted flexion - axis  3#  2x10 3#  2x10 2#-10x  1# 10x         rows  BTB  2x10 BTB  2x10 BTB  3x10         assessment   5'          education   5'          Ther Activity                                       Gait Training                                       Modalities

## 2021-06-25 ENCOUNTER — OFFICE VISIT (OUTPATIENT)
Dept: PHYSICAL THERAPY | Facility: REHABILITATION | Age: 81
End: 2021-06-25
Payer: COMMERCIAL

## 2021-06-25 DIAGNOSIS — S42.202A CLOSED FRACTURE OF PROXIMAL END OF LEFT HUMERUS, UNSPECIFIED FRACTURE MORPHOLOGY, INITIAL ENCOUNTER: Primary | ICD-10-CM

## 2021-06-25 PROCEDURE — 97110 THERAPEUTIC EXERCISES: CPT

## 2021-06-25 PROCEDURE — 97140 MANUAL THERAPY 1/> REGIONS: CPT

## 2021-06-25 PROCEDURE — 97112 NEUROMUSCULAR REEDUCATION: CPT

## 2021-06-25 NOTE — PROGRESS NOTES
Daily Note     Today's date: 2021  Patient name: Lisbet Williamson Sr   : 1940  MRN: [id number]  Referring provider: Nesha Szymanski MD  Dx:   Encounter Diagnosis     ICD-10-CM    1  Closed fracture of proximal end of left humerus, unspecified fracture morphology, initial encounter  S4 on  with PT JRS 8:35-9:22       Subjective: Patient reports that his shoulder is feeling a bit stiffer today secondary to using it more this week  He reports that he has begun to instinctively use his left upper extremity in ADL  He is also now sleeping on his left side without pain  Objective: See treatment diary below      Assessment: Tolerated treatment well  Patient demonstrating improve AAROM and strength  He will continue to benefit from PT aimed at improving GHJ mobility and shoulder complex strength and function  Plan: Continue per plan of care         Precautions: L humeral fx, NWB      Manuals 6/10 6/15 6/18 6/22 6/25/21        Shoulder stretching  AB   JRS        GHJ anterior, posterior, inferior joint mobilizations     JRS                                  Neuro Re-Ed     21                                                                                                   Ther Ex  6/15 6/18 6/22 6/25/21        Skipper Darner carry 6#/7#  40 ft x8 NP 8#/7#  40 ft x8 10#/8#  40 ft x8 NP        pendulums  HEP           Supine cane press  3x10 2x15 2x10  10x 2# 2 x 10 2#        PROM to tolerance 15' 10' 8' 5' JRS x 10'        Supine cane flexion  2x10 2x10 See above         AAROM - cane flexion/ABD standing - 15x ea            Supine KB press OJB  2x15    3#  3x6 3# 3 x 10        Active assisted flexion - axis  3#  2x10 3#  2x10 2#-10x  1# 10x 2 x 10        rows  BTB  2x10 BTB  2x10 BTB  3x10 BTB 3 x 10        Lat Pull Downs     BTB 3 x 10        assessment   5'          education   5'          Ther Activity                                       Gait Training Modalities

## 2021-06-29 ENCOUNTER — OFFICE VISIT (OUTPATIENT)
Dept: PHYSICAL THERAPY | Facility: REHABILITATION | Age: 81
End: 2021-06-29
Payer: COMMERCIAL

## 2021-06-29 DIAGNOSIS — S42.202A CLOSED FRACTURE OF PROXIMAL END OF LEFT HUMERUS, UNSPECIFIED FRACTURE MORPHOLOGY, INITIAL ENCOUNTER: Primary | ICD-10-CM

## 2021-06-29 PROCEDURE — 97140 MANUAL THERAPY 1/> REGIONS: CPT | Performed by: PHYSICAL THERAPIST

## 2021-06-29 PROCEDURE — 97110 THERAPEUTIC EXERCISES: CPT | Performed by: PHYSICAL THERAPIST

## 2021-06-29 NOTE — PROGRESS NOTES
Daily Note     Today's date: 2021  Patient name: Sharon Bruner Sr   : 1940  MRN: [id number]  Referring provider: Ivanna Abraham MD  Dx:   Encounter Diagnosis     ICD-10-CM    1  Closed fracture of proximal end of left humerus, unspecified fracture morphology, initial encounter  S4        Start Time: 830  Stop Time: 915  Total time in clinic (min): 45 minutes    Subjective: Patient feels his reach is improving  Has been utilizing his pool for motion  Pain improving as he is less sore with movement      Objective: See treatment diary below      Assessment: Tolerated treatment well  Patient improving with his reach as he is able to reach to ~ 110 deg overhead, does compensate with a shrug which is due to weakness of RC  He had improved ability to perform active ER at his side today, could rotate past neutral  Will continue to progress his AROM as tolerated  Unable to perform SL ER today  Plan: Continue per plan of care        Precautions: L humeral fx, NWB      Manuals 6/10 6/15 6/18 6/22 6/25/21 6/29       Shoulder stretching  AB   JRS AB       GHJ anterior, posterior, inferior joint mobilizations     JRS AB                                 Neuro Re-Ed     21                                                                                                   Ther Ex  6/15 6/18 6/22 6/25/21 6/29       Farmer carry 6#/7#  40 ft x8 NP 8#/7#  40 ft x8 10#/8#  40 ft x8 NP        pendulums  HEP           Supine cane press  3x10 2x15 2x10  10x 2# 2 x 10 2# 20x  3#       PROM to tolerance 15' 10' 8' 5' JRS x 10'        Supine cane flexion  2x10 2x10 See above         AAROM - cane flexion/ABD standing - 15x ea            Supine KB press OJB  2x15    3#  3x6 3# 3 x 10 3#  3x10       Active assisted flexion - axis  3#  2x10 3#  2x10 2#-10x  1# 10x 2 x 10        SL ER             Wall slides      2x10  2x5 with lift off       Supine flexion - waist to OH      3x10       rows  BTB  2x10 BTB  2x10 BTB  3x10 BTB 3 x 10 BTB  3x10       Lat Pull Downs     BTB 3 x 10        assessment   5'          education   5'          Ther Activity                                       Gait Training                                       Modalities

## 2021-07-02 ENCOUNTER — OFFICE VISIT (OUTPATIENT)
Dept: PHYSICAL THERAPY | Facility: REHABILITATION | Age: 81
End: 2021-07-02
Payer: COMMERCIAL

## 2021-07-02 DIAGNOSIS — S42.202A CLOSED FRACTURE OF PROXIMAL END OF LEFT HUMERUS, UNSPECIFIED FRACTURE MORPHOLOGY, INITIAL ENCOUNTER: Primary | ICD-10-CM

## 2021-07-02 PROCEDURE — 97110 THERAPEUTIC EXERCISES: CPT | Performed by: PHYSICAL THERAPIST

## 2021-07-02 PROCEDURE — 97140 MANUAL THERAPY 1/> REGIONS: CPT | Performed by: PHYSICAL THERAPIST

## 2021-07-02 NOTE — PROGRESS NOTES
PT Re-evaluation    Today's date: 2021  Patient name: Selvin Hernandez   : 1940  MRN: [id number]  Referring provider: Dylan Fletcher MD  Dx:   Encounter Diagnosis     ICD-10-CM    1  Closed fracture of proximal end of left humerus, unspecified fracture morphology, initial encounter  S43 36A        Start Time: 920  Stop Time: 1005  Total time in clinic (min): 45 minutes    Assessment  Assessment details: Patient is a [de-identified]y o  year old male who attended physical therapy for 8 treatment sessions regarding L humeral fx  Patient reports improvement at this time which correlates to improved impairments and functionality  Patient has shown improvement throughout PT by demonstrating decreased pain, increased range of motion, increased strength and improved tolerance to activity  Patient continues to present with pain, decreased ROM, decreased strength, and decreased tolerance to activity  Alana Alford would benefit from continued physical therapy to address these issues and to maximize function  Thank you         Impairments: abnormal muscle firing, abnormal muscle tone, abnormal or restricted ROM, abnormal movement, activity intolerance, impaired physical strength and pain with function  Understanding of Dx/Px/POC: good   Prognosis: good    Plan  Patient would benefit from: skilled PT  Planned modality interventions: thermotherapy: hydrocollator packs  Planned therapy interventions: joint mobilization, manual therapy, ADL training, neuromuscular re-education, home exercise program, therapeutic exercise, therapeutic activities, strengthening, patient education and functional ROM exercises  Frequency: 2x week  Duration in weeks: 8  Treatment plan discussed with: patient        Subjective Evaluation    History of Present Illness  Mechanism of injury: Evaluation:    Alana Alford is a [de-identified] y o  male presenting to physical therapy on 21 with referral from MD for after L humeral head fx on 2021 after a fall when taking his pool over off  Denies any numbness in hand/arm  Not a recurrent problem   Quality of life: good    Pain  Current pain ratin  At worst pain ratin  Location: left shoudler - lateral arm      Diagnostic Tests  X-ray: abnormal  Patient Goals  Patient goals for therapy: decreased pain, increased strength, increased motion, independence with ADLs/IADLs and return to sport/leisure activities      Short Term Goals:   1  Patient will be Independent with hep - MET  2  Patient will improve pain with activity by 50% - NOT MET  3  Patient will improve PROM to 50% of normal - MET      Long Term Goals:   1  Patient will improve FOTO to greater then goal - WILL test NV  2  Patient will improve pain with activity to 2/10 or less - IMPROVED  3  Patient will continue with HEP independence to allow for decreased future reoccurrence of pain and loss in function - MET  4  Patient will be able to functionally reach to back of head  - MET  5 Patient will reaching flexion AROM to 120 deg or greater  - MET      Objective      Dermatome: (pinprick- L/R): normal                      Palpation:TTP anterior, significant bruising noted anterior bicep, into pec region           MMT         AROM          PROM    Shoulder       L       R        L           R      L     R   Flex  3+    135    Abd      110    IR  3+  Upper LS  0    ER   3+  Back of head  65 deg @ 90 deg ABD             Rhomboid         Mid Trap         Low Trap         Serratus         Infraspnatus         Teres Major         Sub Scap                 Segmental mobility: GHJ:  hypomobile post, inferior glides       Precautions: L humeral fx, NWB    Manuals 6/10 6/15 6/18 6/22 6/25/21 6/29 7/2      Shoulder stretching  AB   JRS AB AB      GHJ anterior, posterior, inferior joint mobilizations     JRS AB       assessment       10'                   Neuro Re-Ed     21 Ther Ex  6/15 6/18 6/22 6/25/21 6/29 7/2      Farmer carry 6#/7#  40 ft x8 NP 8#/7#  40 ft x8 10#/8#  40 ft x8 NP        pendulums  HEP           Supine cane press  3x10 2x15 2x10  10x 2# 2 x 10 2# 20x  3# 4x10  GTB      PROM to tolerance 15' 10' 8' 5' JRS x 10'        Supine cane flexion  2x10 2x10 See above         AAROM - cane flexion/ABD standing - 15x ea            Supine KB press OJB  2x15    3#  3x6 3# 3 x 10 3#  3x10 3#  10x  5#  3x5      Active assisted flexion - axis  3#  2x10 3#  2x10 2#-10x  1# 10x 2 x 10        SL ER             Wall slides      2x10  2x5 with lift off NP      RC ER/IR       OTB/GTB  3x10 each      Supine flexion - waist to OH      3x10 See above      rows  BTB  2x10 BTB  2x10 BTB  3x10 BTB 3 x 10 BTB  3x10 BTB  3x10      UBE       3'/3'      Lat Pull Downs     BTB 3 x 10        assessment   5'          education   5'          Ther Activity                                       Gait Training                                       Modalities

## 2021-07-06 ENCOUNTER — OFFICE VISIT (OUTPATIENT)
Dept: PHYSICAL THERAPY | Facility: REHABILITATION | Age: 81
End: 2021-07-06
Payer: COMMERCIAL

## 2021-07-06 DIAGNOSIS — S42.202A CLOSED FRACTURE OF PROXIMAL END OF LEFT HUMERUS, UNSPECIFIED FRACTURE MORPHOLOGY, INITIAL ENCOUNTER: Primary | ICD-10-CM

## 2021-07-06 PROCEDURE — 97110 THERAPEUTIC EXERCISES: CPT | Performed by: PHYSICAL THERAPIST

## 2021-07-06 PROCEDURE — 97140 MANUAL THERAPY 1/> REGIONS: CPT | Performed by: PHYSICAL THERAPIST

## 2021-07-06 NOTE — PROGRESS NOTES
Daily Note     Today's date: 2021  Patient name: Kendrick Peterson Sr   : 1940  MRN: [id number]  Referring provider: Pinky Mattson MD  Dx:   Encounter Diagnosis     ICD-10-CM    1  Closed fracture of proximal end of left humerus, unspecified fracture morphology, initial encounter  S42         Start Time: 830  Stop Time: 915  Total time in clinic (min): 45 minutes    Subjective: Patient reports that he has been doing well overall, did move a box of 50 clams up steps  Objective: See treatment diary below  Flexion ~ 75% normal    Assessment: Tolerated treatment well  Patient does still demonstrate weakness into ER as evident with leg when performing walk outs  Plan: Continue per plan of care        Precautions: L humeral fx, NWB    Manuals 6/10 6/15 6/18 6/22 6/25/21 6/29 7/2 76     Shoulder stretching  AB   JRS AB AB AB     GHJ anterior, posterior, inferior joint mobilizations     JRS AB       assessment       10'                   Neuro Re-Ed     21                                                                                                   Ther Ex  6/15 6/18 6/22 6/25/21 6/29 7/ 7/6     Harinder Jimenez carry 6#/7#  40 ft x8 NP 8#/7#  40 ft x8 10#/8#  40 ft x8 NP        pendulums  HEP           Supine cane press  3x10 2x15 2x10  10x 2# 2 x 10 2# 20x  3# 4x10  GTB NP     PROM to tolerance 15' 10' 8' 5' JRS x 10'        Supine cane flexion  2x10 2x10 See above         AAROM - cane flexion/ABD standing - 15x ea            Supine KB press OJB  2x15    3#  3x6 3# 3 x 10 3#  3x10 3#  10x  5#  3x5 3#  10x  5#  3x7     Active assisted flexion - axis  3#  2x10 3#  2x10 2#-10x  1# 10x 2 x 10        SL ER             Wall slides      2x10  2x5 with lift off NP      Ecc lowering - flexion        2#  3x10     RC ER/IR       OTB/GTB  3x10 each OTB  2x10 ER  Walk outs     Supine flexion - waist to OH      3x10 See above      rows  BTB  2x10 BTB  2x10 BTB  3x10 BTB 3 x 10 BTB  3x10 BTB  3x10 MTB  3x10 UBE       3'/3' 3'/3'     Lat Pull Downs     BTB 3 x 10        assessment   5'          education   5'          Ther Activity                                       Gait Training                                       Modalities

## 2021-07-09 ENCOUNTER — OFFICE VISIT (OUTPATIENT)
Dept: PHYSICAL THERAPY | Facility: REHABILITATION | Age: 81
End: 2021-07-09
Payer: COMMERCIAL

## 2021-07-09 DIAGNOSIS — S42.202A CLOSED FRACTURE OF PROXIMAL END OF LEFT HUMERUS, UNSPECIFIED FRACTURE MORPHOLOGY, INITIAL ENCOUNTER: Primary | ICD-10-CM

## 2021-07-09 PROCEDURE — 97110 THERAPEUTIC EXERCISES: CPT | Performed by: PHYSICAL THERAPIST

## 2021-07-09 NOTE — PROGRESS NOTES
Daily Note     Today's date: 2021  Patient name: Jono Khan Sr   : 1940  MRN: [id number]  Referring provider: Hoda Chambers MD  Dx:   Encounter Diagnosis     ICD-10-CM    1  Closed fracture of proximal end of left humerus, unspecified fracture morphology, initial encounter  S4        Start Time: 08  Stop Time: 0915  Total time in clinic (min): 45 minutes    Subjective: Patient offers no new complaints at this time, stiff this morning from sleeping on L shoulder      Objective: See treatment diary below  Shoulder flexion MMT 3+/5    Assessment: Tolerated treatment well  Patient able to progress with isotonic ER, improved control with 2# ecc lowering  Able to press 15# KB overhead without pain, fatigue noted       Plan: Continue per plan of care        Precautions: L humeral fx, NWB    Manuals 6/10 6/15 6/18 6/22 6/25/21 6/29 7/2 7 7    Shoulder stretching  AB   JRS AB AB AB     GHJ anterior, posterior, inferior joint mobilizations     JRS AB       assessment       10'                   Neuro Re-Ed     21                                                                                                   Ther Ex  6/15 6/18 6/22 6/25/21 6/29 7/2 7 79    Farmer carry 6#/7#  40 ft x8 NP 8#/7#  40 ft x8 10#/8#  40 ft x8 NP    15#  40 ft x8    pendulums  HEP           Supine cane press  3x10 2x15 2x10  10x 2# 2 x 10 2# 20x  3# 4x10  GTB NP     PROM to tolerance 15' 10' 8' 5' JRS x 10'        Supine cane flexion  2x10 2x10 See above         AAROM - cane flexion/ABD standing - 15x ea            Supine KB press OJB  2x15    3#  3x6 3# 3 x 10 3#  3x10 3#  10x  5#  3x5 3#  10x  5#  3x7 5#  4x8    Active assisted flexion - axis  3#  2x10 3#  2x10 2#-10x  1# 10x 2 x 10        SL ER             Wall slides      2x10  2x5 with lift off NP      Ecc lowering - flexion        2#  3x10 2#  3x10    RC ER/IR       OTB/GTB  3x10 each OTB  2x10 ER  Walk outs OTB  3x10 - isotonics    OH reach - 2 arms 15# KB  3x5    Supine flexion - waist to OH      3x10 See above      rows  BTB  2x10 BTB  2x10 BTB  3x10 BTB 3 x 10 BTB  3x10 BTB  3x10 MTB  3x10 [place]-20#  3x10    UBE       3'/3' 3'/3' 3'/3'    Lat Pull Downs     BTB 3 x 10        assessment   5'          education   5'          Ther Activity                                       Gait Training                                       Modalities

## 2021-07-13 ENCOUNTER — OFFICE VISIT (OUTPATIENT)
Dept: PHYSICAL THERAPY | Facility: REHABILITATION | Age: 81
End: 2021-07-13
Payer: COMMERCIAL

## 2021-07-13 DIAGNOSIS — S42.202A CLOSED FRACTURE OF PROXIMAL END OF LEFT HUMERUS, UNSPECIFIED FRACTURE MORPHOLOGY, INITIAL ENCOUNTER: Primary | ICD-10-CM

## 2021-07-13 PROCEDURE — 97140 MANUAL THERAPY 1/> REGIONS: CPT | Performed by: PHYSICAL THERAPIST

## 2021-07-13 PROCEDURE — 97110 THERAPEUTIC EXERCISES: CPT | Performed by: PHYSICAL THERAPIST

## 2021-07-13 NOTE — PROGRESS NOTES
Daily Note     Today's date: 2021  Patient name: Allen Joseph Sr   : 1940  MRN: [id number]  Referring provider: Brittney Pierce MD  Dx:   Encounter Diagnosis     ICD-10-CM    1  Closed fracture of proximal end of left humerus, unspecified fracture morphology, initial encounter  S4A                   Subjective: Jaqueline Ndiaye reports compliance with home exercise program currently with minimal to no overall pain  Objective: See treatment diary below  Shoulder flexion MMT 3+/5    Assessment: Review of home exercise program was tolerated well with minimal to no pain/soreness throughout  Obvious ER and general shoulder weakness with continued strengthening required for decreased difficulty with all functional reaching ability  Some limitations in progression secondary to patient arriving late  Plan: Continue per plan of care        Precautions: L humeral fx, NWB    Manuals 6/10 6/15 6/18 6/22 6/25/21 6/29 7/2 7/6 7   Shoulder stretching  AB   JRS AB AB AB  ATS   GHJ anterior, posterior, inferior joint mobilizations     JRS AB       assessment       10'                   Neuro Re-Ed     21                                                                                                   Ther Ex  6/15 6/18 6/22 6/25/21 6/29 7/2 7/6 7/9    Farmer carry 6#/7#  40 ft x8 NP 8#/7#  40 ft x8 10#/8#  40 ft x8 NP    15#  40 ft x8    pendulums  HEP           Supine cane press  3x10 2x15 2x10  10x 2# 2 x 10 2# 20x  3# 4x10  GTB NP     PROM to tolerance 15' 10' 8' 5' JRS x 10'        Supine cane flexion  2x10 2x10 See above         AAROM - cane flexion/ABD standing - 15x ea            Supine KB press OJB  2x15    3#  3x6 3# 3 x 10 3#  3x10 3#  10x  5#  3x5 3#  10x  5#  3x7 5#  4x8    Active assisted flexion - axis  3#  2x10 3#  2x10 2#-10x  1# 10x 2 x 10        SL ER          2x15, 2 pounds   Wall slides      2x10  2x5 with lift off NP      Ecc lowering - flexion        2#  3x10 2#  3x10 2# 3x10 RC ER/IR       OTB/GTB  3x10 each OTB  2x10 ER  Walk outs OTB  3x10 - isotonics GTB 3x10   OH reach - 2 arms         15# KB  3x5 15# KB 3x5   Supine flexion - waist to OH      3x10 See above      rows  BTB  2x10 BTB  2x10 BTB  3x10 BTB 3 x 10 BTB  3x10 BTB  3x10 MTB  3x10 Mik[place]-20#  3x10 PTB 3x15   UBE       3'/3' 3'/3' 3'/3'    Lat Pull Downs     BTB 3 x 10        assessment   5'          education   5'          Ther Activity                                       Gait Training                                       Modalities

## 2021-07-22 ENCOUNTER — OFFICE VISIT (OUTPATIENT)
Dept: PHYSICAL THERAPY | Facility: REHABILITATION | Age: 81
End: 2021-07-22
Payer: COMMERCIAL

## 2021-07-22 DIAGNOSIS — S42.202A CLOSED FRACTURE OF PROXIMAL END OF LEFT HUMERUS, UNSPECIFIED FRACTURE MORPHOLOGY, INITIAL ENCOUNTER: Primary | ICD-10-CM

## 2021-07-22 PROCEDURE — 97110 THERAPEUTIC EXERCISES: CPT | Performed by: PHYSICAL THERAPIST

## 2021-07-22 PROCEDURE — 97112 NEUROMUSCULAR REEDUCATION: CPT | Performed by: PHYSICAL THERAPIST

## 2021-07-22 NOTE — PROGRESS NOTES
Daily Note     Today's date: 2021  Patient name: Edmundo Hua Sr   : 1940  MRN: [id number]  Referring provider: Dylan Fletcher MD  Dx:   Encounter Diagnosis     ICD-10-CM    1  Closed fracture of proximal end of left humerus, unspecified fracture morphology, initial encounter  S4        Start Time: 1130  Stop Time: 1215  Total time in clinic (min): 45 minutes    Subjective: Patient reports feeling well, shoulder doing very well with minimal to no pain  Objective: See treatment diary below  RC ER MMT - 4/5  RC IR MMT - 4/5  Shoulder AROM WNL    Assessment: Tolerated treatment well  Patient continues to progress well with strength, minimal compensation with bottom up KB press overhead, as he was able to maintain ER  Plan: Continue per plan of care        Precautions: L humeral fx, NWB    Manuals    Shoulder stretching ER     AB AB AB  ATS   GHJ anterior, posterior, inferior joint mobilizations      AB       assessment       10'                   Neuro Re-Ed                                                                                                        Ther Ex     Emily Recinos carry         15#  40 ft x8    pendulums             Supine cane press      20x  3# 4x10  GTB NP     PROM to tolerance             Supine cane flexion             AAROM - cane flexion/ABD             Bottom up KB press - overhead 4#  3x10     3#  3x10 3#  10x  5#  3x5 3#  10x  5#  3x7 5#  4x8    Active assisted flexion - axis             SL ER          2x15, 2 pounds   Wall slides      2x10  2x5 with lift off NP      Ecc lowering - scap 3x8  3#       2#  3x10 2#  3x10 2# 3x10   RC ER/IR BTB  3x10      OTB/GTB  3x10 each OTB  2x10 ER  Walk outs OTB  3x10 - isotonics GTB 3x10   OH reach - 2 arms         15# KB  3x5 15# KB 3x5   Supine flexion - waist to OH      3x10 See above      rows Mikal - 20# 3x10     BTB  3x10 BTB  3x10 MTB  3x10 [place]-20#  3x10 PTB 3x15 UBE 3'/3'      3'/3' 3'/3' 3'/3'    Lat Pull [place]             assessment             education             Ther Activity                                       Gait Training                                       Modalities

## 2021-07-27 ENCOUNTER — APPOINTMENT (OUTPATIENT)
Dept: PHYSICAL THERAPY | Facility: REHABILITATION | Age: 81
End: 2021-07-27
Payer: COMMERCIAL

## 2021-07-30 ENCOUNTER — OFFICE VISIT (OUTPATIENT)
Dept: PHYSICAL THERAPY | Facility: REHABILITATION | Age: 81
End: 2021-07-30
Payer: COMMERCIAL

## 2021-07-30 DIAGNOSIS — S42.202A CLOSED FRACTURE OF PROXIMAL END OF LEFT HUMERUS, UNSPECIFIED FRACTURE MORPHOLOGY, INITIAL ENCOUNTER: Primary | ICD-10-CM

## 2021-07-30 PROCEDURE — 97140 MANUAL THERAPY 1/> REGIONS: CPT | Performed by: PHYSICAL THERAPIST

## 2021-07-30 PROCEDURE — 97112 NEUROMUSCULAR REEDUCATION: CPT | Performed by: PHYSICAL THERAPIST

## 2021-07-30 NOTE — PROGRESS NOTES
PT Re-evaluation/Discharge    Today's date: 2021  Patient name: Lima Natarajan  : 1940  MRN: [id number]  Referring provider: Maribel Ryder MD  Dx:   Encounter Diagnosis     ICD-10-CM    1  Closed fracture of proximal end of left humerus, unspecified fracture morphology, initial encounter  S42         Start Time: 825  Stop Time: 0910  Total time in clinic (min): 45 minutes    Assessment  Assessment details: Patient is a [de-identified]y o  year old male who attended physical therapy for 13 treatment sessions regarding left shoulder fx and underlying RC pathology  Patient reports % improvement at this time which correlates to improved impairments and functionality  Patient has shown improvement throughout PT by demonstrating decreased pain, increased range of motion, increased strength and improved tolerance to activity  Will DC PT at this time as patient has progressed very well throughout PT  HEP reviewed for continued RC and scap strengthening  Plan  Patient would benefit from: skilled PT  Treatment plan discussed with: patient        Subjective Evaluation    History of Present Illness  Mechanism of injury:     Evaluation:Saud is a [de-identified] y o  male presenting to physical therapy on 21 with referral from MD for after L humeral head fx on 2021 after a fall when taking his pool over off  Denies any numbness in hand/arm  Not a recurrent problem   Quality of life: good    Pain  Current pain ratin  At worst pain ratin  Location: left shoudler - lateral arm      Diagnostic Tests  X-ray: abnormal  Patient Goals  Patient goals for therapy: decreased pain, increased strength, increased motion, independence with ADLs/IADLs and return to sport/leisure activities      Short Term Goals:   1  Patient will be Independent with hep - MET  2  Patient will improve pain with activity by 50% - MET  3  Patient will improve PROM to 50% of normal - MET      Long Term Goals:   1   Patient will improve FOTO to greater then goal - MET  2  Patient will improve pain with activity to 2/10 or less - MET  3  Patient will continue with HEP independence to allow for decreased future reoccurrence of pain and loss in function - MET  4  Patient will be able to functionally reach to back of head  - MET  5 Patient will reaching flexion AROM to 120 deg or greater  - MET      Objective      Dermatome: (pinprick- L/R): normal                               MMT         AROM          PROM    Shoulder       L       R        L           R      L     R   Flex  3+  160      Abd  3+  160      IR  4  Mid TS  30    ER   3+  T3  80             Rhomboid         Mid Trap         Low Trap         Serratus         Infraspnatus         Teres Major         Sub Scap                 Segmental mobility: GHJ:  hypomobile post, inferior glides       Precautions: L humeral fx, NWB      Manuals             Assessment                                                    Neuro Re-Ed             Supine flexion BTB  3x10            Supine horizontal ABD BTB  3x10            Supine ER BTB  3x10            Mod push up at counter 3x10            Standing TB ER BTB  3x10                                      Ther Ex             UBE 4/4 min                                                                                                       Ther Activity                                       Gait Training                                       Modalities

## 2021-08-03 ENCOUNTER — APPOINTMENT (OUTPATIENT)
Dept: RADIOLOGY | Facility: AMBULARY SURGERY CENTER | Age: 81
End: 2021-08-03
Attending: ORTHOPAEDIC SURGERY
Payer: COMMERCIAL

## 2021-08-03 VITALS
SYSTOLIC BLOOD PRESSURE: 190 MMHG | DIASTOLIC BLOOD PRESSURE: 122 MMHG | WEIGHT: 189 LBS | HEIGHT: 70 IN | BODY MASS INDEX: 27.06 KG/M2 | HEART RATE: 90 BPM

## 2021-08-03 DIAGNOSIS — M25.512 ACUTE PAIN OF LEFT SHOULDER: ICD-10-CM

## 2021-08-03 DIAGNOSIS — S42.295A OTHER CLOSED NONDISPLACED FRACTURE OF PROXIMAL END OF LEFT HUMERUS, INITIAL ENCOUNTER: Primary | ICD-10-CM

## 2021-08-03 PROCEDURE — 99024 POSTOP FOLLOW-UP VISIT: CPT | Performed by: ORTHOPAEDIC SURGERY

## 2021-08-03 PROCEDURE — 73030 X-RAY EXAM OF SHOULDER: CPT

## 2021-08-03 NOTE — PROGRESS NOTES
Patient Name:  Nissa Riley  MRN:  [id number]    Assessment & Plan     Left proximal humerus fracture 5/11/2021  1  Continue home exercises for strengthening  2  Gradually resume normal activity  Cleared to return to golf as pain allows  3  Follow up as needed  History of the Present Illness     [de-identified] y/o male who presents today 3 months s/p left proximal humerus fracture sustained on 5/11/2021  Today, patient continues to note overall improvements of his symptoms  He denies pain about the shoulder on a daily basis  He is performing his daily activities to his tolerance without complications or pain  Physical Exam     BP (!) 190/122   Pulse 90   Ht 5' 10" (1 778 m)   Wt 85 7 kg (189 lb)   BMI 27 12 kg/m²     Left  shoulder:  Tenderness:  None  Range of motion:   FF: 160   ER-abduction:  90   IR-abduction:  40  Strength:  FF 5/5  Impingement signs:  Negative  Empty can test:  Negative  CV:  2+ radial pulse  Skin:  Intact without erythema    Data Review     I have personally reviewed pertinent films in PACS, and my interpretation follows  XR left shoulder 8/3/2021:  Healed fracture in stable alignment        Scribe Attestation    I,:  Sierra Palomares am acting as a scribe while in the presence of the attending physician :       I,:  Kaveh Daugherty MD personally performed the services described in this documentation    as scribed in my presence :

## 2021-09-29 ENCOUNTER — DIAGNOSTICS ONLY (OUTPATIENT)
Dept: URBAN - METROPOLITAN AREA CLINIC 6 | Facility: CLINIC | Age: 81
End: 2021-09-29

## 2021-10-04 ENCOUNTER — NEW PATIENT (OUTPATIENT)
Dept: URBAN - METROPOLITAN AREA CLINIC 6 | Facility: CLINIC | Age: 81
End: 2021-10-04

## 2021-10-04 ENCOUNTER — CONSULTATION (OUTPATIENT)
Dept: URBAN - METROPOLITAN AREA CLINIC 6 | Facility: CLINIC | Age: 81
End: 2021-10-04

## 2021-10-04 DIAGNOSIS — H02.403: ICD-10-CM

## 2021-10-04 DIAGNOSIS — H02.035: ICD-10-CM

## 2021-10-04 PROCEDURE — 99244 OFF/OP CNSLTJ NEW/EST MOD 40: CPT

## 2021-10-04 ASSESSMENT — VISUAL ACUITY
OS_SC: 20/40
OD_SC: 20/30

## 2021-10-04 ASSESSMENT — TONOMETRY
OS_IOP_MMHG: 12
OD_IOP_MMHG: 14

## 2021-11-15 ENCOUNTER — SURGERY/PROCEDURE (OUTPATIENT)
Dept: URBAN - METROPOLITAN AREA SURGICAL CENTER 6 | Facility: SURGICAL CENTER | Age: 81
End: 2021-11-15

## 2021-11-15 DIAGNOSIS — H02.035: ICD-10-CM

## 2021-11-15 PROCEDURE — 67924 REPAIR EYELID DEFECT: CPT

## 2021-11-22 ENCOUNTER — 1 WEEK POST-OP (OUTPATIENT)
Dept: URBAN - METROPOLITAN AREA CLINIC 6 | Facility: CLINIC | Age: 81
End: 2021-11-22

## 2021-11-22 DIAGNOSIS — Z98.890: ICD-10-CM

## 2021-11-22 PROCEDURE — 1036F TOBACCO NON-USER: CPT

## 2021-11-22 PROCEDURE — 99024 POSTOP FOLLOW-UP VISIT: CPT

## 2021-11-22 PROCEDURE — G8428 CUR MEDS NOT DOCUMENT: HCPCS

## 2021-11-22 ASSESSMENT — VISUAL ACUITY
OD_SC: 20/40-1
OS_SC: 20/50+1

## 2021-11-22 ASSESSMENT — TONOMETRY
OD_IOP_MMHG: 8
OS_IOP_MMHG: 8

## 2021-12-20 ENCOUNTER — 1 MONTH POST-OP (OUTPATIENT)
Dept: URBAN - METROPOLITAN AREA CLINIC 6 | Facility: CLINIC | Age: 81
End: 2021-12-20

## 2021-12-20 DIAGNOSIS — Z98.890: ICD-10-CM

## 2021-12-20 PROCEDURE — 99024 POSTOP FOLLOW-UP VISIT: CPT

## 2021-12-20 ASSESSMENT — VISUAL ACUITY
OD_SC: 20/50+1
OS_SC: 20/40+2

## 2022-03-22 ENCOUNTER — PROBLEM (OUTPATIENT)
Dept: URBAN - METROPOLITAN AREA CLINIC 6 | Facility: CLINIC | Age: 82
End: 2022-03-22

## 2022-03-22 DIAGNOSIS — Z98.890: ICD-10-CM

## 2022-03-22 DIAGNOSIS — H02.032: ICD-10-CM

## 2022-03-22 PROCEDURE — 92012 INTRM OPH EXAM EST PATIENT: CPT

## 2022-03-22 ASSESSMENT — TONOMETRY
OD_IOP_MMHG: 13
OS_IOP_MMHG: 11

## 2022-03-22 ASSESSMENT — VISUAL ACUITY
OU_CC: J1+
OS_SC: 20/40
OS_PH: 20/30-2
OD_SC: 20/40

## 2022-03-28 ENCOUNTER — DIAGNOSTICS ONLY (OUTPATIENT)
Dept: URBAN - METROPOLITAN AREA CLINIC 6 | Facility: CLINIC | Age: 82
End: 2022-03-28

## 2022-03-28 DIAGNOSIS — H02.032: ICD-10-CM

## 2022-03-28 PROCEDURE — 92083 EXTENDED VISUAL FIELD XM: CPT

## 2022-04-04 ENCOUNTER — FOLLOW UP (OUTPATIENT)
Dept: URBAN - METROPOLITAN AREA CLINIC 6 | Facility: CLINIC | Age: 82
End: 2022-04-04

## 2022-04-04 DIAGNOSIS — H02.032: ICD-10-CM

## 2022-04-04 DIAGNOSIS — Z98.890: ICD-10-CM

## 2022-04-04 DIAGNOSIS — H04.123: ICD-10-CM

## 2022-04-04 DIAGNOSIS — H02.423: ICD-10-CM

## 2022-04-04 PROCEDURE — 92083 EXTENDED VISUAL FIELD XM: CPT

## 2022-04-04 PROCEDURE — 99213 OFFICE O/P EST LOW 20 MIN: CPT

## 2022-04-04 ASSESSMENT — VISUAL ACUITY
OD_SC: 20/30
OS_PH: 20/30+2
OS_SC: 20/40

## 2022-06-06 ENCOUNTER — SURGERY/PROCEDURE (OUTPATIENT)
Dept: URBAN - METROPOLITAN AREA SURGICAL CENTER 6 | Facility: SURGICAL CENTER | Age: 82
End: 2022-06-06

## 2022-06-06 DIAGNOSIS — H02.032: ICD-10-CM

## 2022-06-06 PROCEDURE — 67924 REPAIR EYELID DEFECT: CPT

## 2022-06-13 ENCOUNTER — POST-OP CHECK (OUTPATIENT)
Dept: URBAN - METROPOLITAN AREA CLINIC 6 | Facility: CLINIC | Age: 82
End: 2022-06-13

## 2022-06-13 DIAGNOSIS — Z98.890: ICD-10-CM

## 2022-06-13 PROCEDURE — 99024 POSTOP FOLLOW-UP VISIT: CPT

## 2022-06-13 ASSESSMENT — VISUAL ACUITY
OS_SC: 20/40-1
OS_PH: 20/40
OD_SC: 20/60-1
OU_CC: J3
OD_PH: 20/50

## 2022-06-13 ASSESSMENT — TONOMETRY
OD_IOP_MMHG: 15
OS_IOP_MMHG: 14

## 2022-09-19 ENCOUNTER — FOLLOW UP (OUTPATIENT)
Dept: URBAN - METROPOLITAN AREA CLINIC 6 | Facility: CLINIC | Age: 82
End: 2022-09-19

## 2022-09-19 DIAGNOSIS — Z98.890: ICD-10-CM

## 2022-09-19 DIAGNOSIS — H02.423: ICD-10-CM

## 2022-09-19 DIAGNOSIS — H04.123: ICD-10-CM

## 2022-09-19 PROCEDURE — 99024 POSTOP FOLLOW-UP VISIT: CPT

## 2022-09-19 ASSESSMENT — VISUAL ACUITY
OS_SC: 20/80-1
OD_PH: 20/40
OD_SC: 20/50
OS_PH: 20/70+1

## 2025-08-06 ENCOUNTER — APPOINTMENT (OUTPATIENT)
Dept: LAB | Facility: CLINIC | Age: 85
End: 2025-08-06
Payer: COMMERCIAL

## 2025-08-06 ENCOUNTER — OFFICE VISIT (OUTPATIENT)
Dept: INTERNAL MEDICINE CLINIC | Facility: CLINIC | Age: 85
End: 2025-08-06
Payer: COMMERCIAL

## 2025-08-06 VITALS
DIASTOLIC BLOOD PRESSURE: 76 MMHG | BODY MASS INDEX: 24.65 KG/M2 | OXYGEN SATURATION: 96 % | HEART RATE: 84 BPM | TEMPERATURE: 98 F | SYSTOLIC BLOOD PRESSURE: 134 MMHG | HEIGHT: 72 IN | WEIGHT: 182 LBS

## 2025-08-06 DIAGNOSIS — R73.01 ABNORMAL FASTING GLUCOSE: ICD-10-CM

## 2025-08-06 DIAGNOSIS — I50.9 CHRONIC CONGESTIVE HEART FAILURE, UNSPECIFIED HEART FAILURE TYPE (HCC): ICD-10-CM

## 2025-08-06 DIAGNOSIS — N18.2 CKD (CHRONIC KIDNEY DISEASE) STAGE 2, GFR 60-89 ML/MIN: ICD-10-CM

## 2025-08-06 DIAGNOSIS — S81.802S OPEN WOUND OF LEFT LOWER LEG, SEQUELA: ICD-10-CM

## 2025-08-06 DIAGNOSIS — Z95.3 HISTORY OF MITRAL VALVE REPLACEMENT WITH PORCINE VALVE: ICD-10-CM

## 2025-08-06 DIAGNOSIS — E78.1 PURE HYPERTRIGLYCERIDEMIA: ICD-10-CM

## 2025-08-06 DIAGNOSIS — I48.20 CHRONIC ATRIAL FIBRILLATION (HCC): Primary | ICD-10-CM

## 2025-08-06 DIAGNOSIS — Z79.899 ENCOUNTER FOR LONG-TERM CURRENT USE OF MEDICATION: ICD-10-CM

## 2025-08-06 DIAGNOSIS — C43.9 MALIGNANT MELANOMA OF SKIN (HCC): ICD-10-CM

## 2025-08-06 DIAGNOSIS — I10 PRIMARY HYPERTENSION: ICD-10-CM

## 2025-08-06 DIAGNOSIS — R63.4 WEIGHT LOSS: ICD-10-CM

## 2025-08-06 DIAGNOSIS — M1A.9XX0 CHRONIC GOUT WITHOUT TOPHUS, UNSPECIFIED CAUSE, UNSPECIFIED SITE: ICD-10-CM

## 2025-08-06 DIAGNOSIS — S42.202D CLOSED FRACTURE OF PROXIMAL END OF LEFT HUMERUS WITH ROUTINE HEALING, UNSPECIFIED FRACTURE MORPHOLOGY, SUBSEQUENT ENCOUNTER: ICD-10-CM

## 2025-08-06 DIAGNOSIS — N40.1 BENIGN PROSTATIC HYPERPLASIA WITH LOWER URINARY TRACT SYMPTOMS, SYMPTOM DETAILS UNSPECIFIED: ICD-10-CM

## 2025-08-06 PROBLEM — H91.93 BILATERAL HEARING LOSS: Status: ACTIVE | Noted: 2025-08-06

## 2025-08-06 PROBLEM — T81.89XA PROTRUDING STERNAL WIRES: Status: ACTIVE | Noted: 2024-08-28

## 2025-08-06 PROCEDURE — 99204 OFFICE O/P NEW MOD 45 MIN: CPT | Performed by: INTERNAL MEDICINE

## 2025-08-06 RX ORDER — HYDROCODONE BITARTRATE AND ACETAMINOPHEN 5; 325 MG/1; MG/1
1 TABLET ORAL DAILY PRN
Start: 2025-08-06

## 2025-08-06 RX ORDER — AMLODIPINE BESYLATE 5 MG/1
5 TABLET ORAL DAILY
Start: 2025-08-06

## 2025-08-06 RX ORDER — OLMESARTAN MEDOXOMIL 40 MG/1
40 TABLET ORAL DAILY
Start: 2025-08-06

## 2025-08-11 ENCOUNTER — HOSPITAL ENCOUNTER (OUTPATIENT)
Dept: RADIOLOGY | Facility: HOSPITAL | Age: 85
Discharge: HOME | End: 2025-08-11
Payer: COMMERCIAL

## (undated) RX ORDER — ERYTHROMYCIN 5 MG/G: 1/4 OINTMENT OPHTHALMIC